# Patient Record
Sex: FEMALE | Race: ASIAN | NOT HISPANIC OR LATINO | Employment: UNEMPLOYED | URBAN - METROPOLITAN AREA
[De-identification: names, ages, dates, MRNs, and addresses within clinical notes are randomized per-mention and may not be internally consistent; named-entity substitution may affect disease eponyms.]

---

## 2018-03-07 PROBLEM — R11.2 INTRACTABLE VOMITING WITH NAUSEA: Status: ACTIVE | Noted: 2018-03-07

## 2018-03-07 PROBLEM — K62.5 BRBPR (BRIGHT RED BLOOD PER RECTUM): Status: ACTIVE | Noted: 2018-03-07

## 2018-03-07 PROBLEM — R19.7 DIARRHEA: Status: ACTIVE | Noted: 2018-03-07

## 2018-03-23 PROBLEM — K62.5 RECTAL BLEEDING: Status: ACTIVE | Noted: 2018-03-23

## 2018-05-29 PROBLEM — S83.511A RUPTURE OF ANTERIOR CRUCIATE LIGAMENT OF RIGHT KNEE: Status: ACTIVE | Noted: 2018-05-29

## 2018-07-09 PROBLEM — Z78.9 DIFFICULTY NAVIGATING STAIRS: Status: ACTIVE | Noted: 2018-07-09

## 2018-07-09 PROBLEM — M62.81 MUSCLE WEAKNESS: Status: ACTIVE | Noted: 2018-07-09

## 2018-07-09 PROBLEM — R26.2 DIFFICULTY WALKING: Status: ACTIVE | Noted: 2018-07-09

## 2018-07-09 PROBLEM — M25.60 LIMITED JOINT RANGE OF MOTION (ROM): Status: ACTIVE | Noted: 2018-07-09

## 2018-07-09 PROBLEM — R60.0 EDEMA OF RIGHT LOWER EXTREMITY: Status: ACTIVE | Noted: 2018-07-09

## 2018-07-09 PROBLEM — Z74.09 IMPAIRED FUNCTIONAL MOBILITY, BALANCE, GAIT, AND ENDURANCE: Status: ACTIVE | Noted: 2018-07-09

## 2018-11-17 ENCOUNTER — NURSE TRIAGE (OUTPATIENT)
Dept: ADMINISTRATIVE | Facility: CLINIC | Age: 38
End: 2018-11-17
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
## 2018-11-19 NOTE — TELEPHONE ENCOUNTER
Spoke to pt will contact if need   appt next week or so due to   As of now starting to feel better     Explained to rest, compresses,   Soaks, and mild exercises   If any changes or things get worse  Contact clinic voiced understanding

## 2019-11-20 PROBLEM — K50.90 CROHN'S DISEASE: Status: ACTIVE | Noted: 2019-11-20

## 2020-03-12 ENCOUNTER — PATIENT MESSAGE (OUTPATIENT)
Dept: PHARMACY | Facility: CLINIC | Age: 40
End: 2020-03-12

## 2020-03-12 ENCOUNTER — TELEPHONE (OUTPATIENT)
Dept: PHARMACY | Facility: CLINIC | Age: 40
End: 2020-03-12

## 2020-03-12 NOTE — TELEPHONE ENCOUNTER
LVM to notify the patient we received the prescription for Humira, and to see if she has any active prescription insurance. Preferred pharmacies only have discount cards on file for the patient. Will send [employer] message. We will continue to follow up.

## 2020-03-16 ENCOUNTER — TELEPHONE (OUTPATIENT)
Dept: PHARMACY | Facility: CLINIC | Age: 40
End: 2020-03-16

## 2020-03-16 NOTE — TELEPHONE ENCOUNTER
LVM to notify the patient we received the prescription for Humira, and to see if she has any active prescription insurance. We will continue to follow up.

## 2020-03-18 ENCOUNTER — TELEPHONE (OUTPATIENT)
Dept: PHARMACY | Facility: CLINIC | Age: 40
End: 2020-03-18

## 2020-03-20 ENCOUNTER — TELEPHONE (OUTPATIENT)
Dept: PHARMACY | Facility: CLINIC | Age: 40
End: 2020-03-20

## 2020-03-20 NOTE — TELEPHONE ENCOUNTER
LVM to see if patient has prescription insurance. I will also message the provider and mail out a postcard. Will push the patient out 2 weeks.

## 2020-07-08 PROBLEM — S82.041A CLOSED DISPLACED COMMINUTED FRACTURE OF RIGHT PATELLA: Status: ACTIVE | Noted: 2020-07-08

## 2020-10-07 PROBLEM — Z87.81 S/P ORIF (OPEN REDUCTION INTERNAL FIXATION) FRACTURE: Status: ACTIVE | Noted: 2020-10-07

## 2020-10-07 PROBLEM — Z98.890 S/P ORIF (OPEN REDUCTION INTERNAL FIXATION) FRACTURE: Status: ACTIVE | Noted: 2020-10-07

## 2021-05-06 ENCOUNTER — PATIENT MESSAGE (OUTPATIENT)
Dept: RESEARCH | Facility: HOSPITAL | Age: 41
End: 2021-05-06

## 2021-05-10 ENCOUNTER — PATIENT MESSAGE (OUTPATIENT)
Dept: RESEARCH | Facility: HOSPITAL | Age: 41
End: 2021-05-10

## 2022-01-24 ENCOUNTER — PATIENT MESSAGE (OUTPATIENT)
Dept: ADMINISTRATIVE | Facility: HOSPITAL | Age: 42
End: 2022-01-24

## 2022-02-16 PROBLEM — R10.84 GENERALIZED ABDOMINAL PAIN: Status: ACTIVE | Noted: 2022-02-16

## 2022-04-04 ENCOUNTER — PATIENT MESSAGE (OUTPATIENT)
Dept: ADMINISTRATIVE | Facility: HOSPITAL | Age: 42
End: 2022-04-04

## 2022-05-10 ENCOUNTER — PATIENT OUTREACH (OUTPATIENT)
Dept: ADMINISTRATIVE | Facility: HOSPITAL | Age: 42
End: 2022-05-10

## 2022-07-11 ENCOUNTER — PATIENT MESSAGE (OUTPATIENT)
Dept: ADMINISTRATIVE | Facility: HOSPITAL | Age: 42
End: 2022-07-11

## 2022-10-03 ENCOUNTER — PATIENT MESSAGE (OUTPATIENT)
Dept: ADMINISTRATIVE | Facility: HOSPITAL | Age: 42
End: 2022-10-03

## 2022-12-14 PROBLEM — N83.201 CYST OF RIGHT OVARY: Status: RESOLVED | Noted: 2022-12-14 | Resolved: 2022-12-14

## 2022-12-14 PROBLEM — Z87.42 S/P REMOVAL OF OVARIAN CYST: Status: ACTIVE | Noted: 2022-12-14

## 2022-12-14 PROBLEM — N83.201 CYST OF RIGHT OVARY: Status: ACTIVE | Noted: 2022-12-14

## 2022-12-14 PROBLEM — Z98.890 S/P REMOVAL OF OVARIAN CYST: Status: ACTIVE | Noted: 2022-12-14

## 2023-03-22 ENCOUNTER — TELEPHONE (OUTPATIENT)
Dept: ENDOSCOPY | Facility: HOSPITAL | Age: 43
End: 2023-03-22
Payer: MEDICARE

## 2023-03-22 ENCOUNTER — TELEPHONE (OUTPATIENT)
Dept: GASTROENTEROLOGY | Facility: CLINIC | Age: 43
End: 2023-03-22
Payer: MEDICARE

## 2023-03-22 NOTE — TELEPHONE ENCOUNTER
----- Message from Trini Bang MA sent at 3/22/2023  1:49 PM CDT -----  Regarding: Manometry  Manometry case request was placed today by Dr. Sidhu.  Patient instructed to call for an appointment.  Please let me know if anything else is needed.  Thank you

## 2023-03-28 ENCOUNTER — TELEPHONE (OUTPATIENT)
Dept: ENDOSCOPY | Facility: HOSPITAL | Age: 43
End: 2023-03-28
Payer: MEDICARE

## 2023-03-28 NOTE — TELEPHONE ENCOUNTER
Patient has orders for an Esophageal Manometry with pH probe insertion. Does patient need a 24 hour pH impedance or an EGD with Bravo? Please clarify and indicate if patient should be on or off PPI/H2 Blockers. Thanks.

## 2023-04-24 ENCOUNTER — TELEPHONE (OUTPATIENT)
Dept: ENDOSCOPY | Facility: HOSPITAL | Age: 43
End: 2023-04-24
Payer: MEDICARE

## 2023-04-24 VITALS — BODY MASS INDEX: 38.51 KG/M2 | HEIGHT: 61 IN | WEIGHT: 204 LBS

## 2023-04-24 NOTE — TELEPHONE ENCOUNTER
Good afternoon,     I got this from MD Sidhu:     She should be off ppi/h2 blocker. And EGD with bravo is fine.   Thanks,   Karlie

## 2023-05-01 ENCOUNTER — HOSPITAL ENCOUNTER (OUTPATIENT)
Facility: HOSPITAL | Age: 43
Discharge: HOME | End: 2023-05-01
Attending: INTERNAL MEDICINE | Admitting: INTERNAL MEDICINE
Payer: MEDICARE

## 2023-05-01 VITALS
HEIGHT: 61 IN | TEMPERATURE: 98 F | BODY MASS INDEX: 37.76 KG/M2 | HEART RATE: 84 BPM | DIASTOLIC BLOOD PRESSURE: 72 MMHG | WEIGHT: 200 LBS | OXYGEN SATURATION: 96 % | RESPIRATION RATE: 15 BRPM | SYSTOLIC BLOOD PRESSURE: 146 MMHG

## 2023-05-01 DIAGNOSIS — K21.9 GERD (GASTROESOPHAGEAL REFLUX DISEASE): ICD-10-CM

## 2023-05-01 PROCEDURE — 91010 ESOPHAGUS MOTILITY STUDY: CPT | Mod: TC | Performed by: INTERNAL MEDICINE

## 2023-05-01 PROCEDURE — 25000003 PHARM REV CODE 250: Performed by: INTERNAL MEDICINE

## 2023-05-01 RX ORDER — LIDOCAINE HYDROCHLORIDE 20 MG/ML
JELLY TOPICAL ONCE
Status: COMPLETED | OUTPATIENT
Start: 2023-05-01 | End: 2023-05-01

## 2023-05-01 RX ADMIN — LIDOCAINE HYDROCHLORIDE 10 ML: 20 JELLY TOPICAL at 02:05

## 2023-05-02 PROCEDURE — 91010 ESOPHAGUS MOTILITY STUDY: CPT | Mod: 26,,, | Performed by: INTERNAL MEDICINE

## 2023-05-02 PROCEDURE — 91010 PR ESOPHAGEAL MOTILITY STUDY, MA2METRY: ICD-10-PCS | Mod: 26,,, | Performed by: INTERNAL MEDICINE

## 2023-05-02 NOTE — [employer] PATIENT INSTRUCTIONS
Discharge Summary/Instructions after an Endoscopic Procedure  Patient Name: Karyn Bobby  Patient MRN: [id number]  Patient YOB: 1980  Tuesday, May 2, 2023  Lexi Lewis MD  Dear patient,  As a result of recent federal legislation (The Federal Cures Act), you may   receive lab or pathology results from your procedure in your [facility]   account before your physician is able to contact you. Your physician or   their representative will relay the results to you with their   recommendations at their soonest availability.  Thank you,  RESTRICTIONS:  During your procedure today, you received medications for sedation.  These   medications may affect your judgment, balance and coordination.  Therefore,   for 24 hours, you have the following restrictions:   - DO NOT drive a car, operate machinery, make legal/financial decisions,   sign important papers or drink alcohol.    ACTIVITY:  Today: no heavy lifting, straining or running due to procedural   sedation/anesthesia.  The following day: return to full activity including work.  DIET:  Eat and drink normally unless instructed otherwise.     TREATMENT FOR COMMON SIDE EFFECTS:  - Mild abdominal pain, nausea, belching, bloating or excessive gas:  rest,   eat lightly and use a heating pad.  - Sore Throat: treat with throat lozenges and/or gargle with warm salt   water.  - Because air was used during the procedure, expelling large amounts of air   from your rectum or belching is normal.  - If a bowel prep was taken, you may not have a bowel movement for 1-3 days.    This is normal.  SYMPTOMS TO WATCH FOR AND REPORT TO YOUR PHYSICIAN:  1. Abdominal pain or bloating, other than gas cramps.  2. Chest pain.  3. Back pain.  4. Signs of infection such as: chills or fever occurring within 24 hours   after the procedure.  5. Rectal bleeding, which would show as bright red, maroon, or black stools.   (A tablespoon of blood from the rectum is not serious, especially if    hemorrhoids are present.)  6. Vomiting.  7. Weakness or dizziness.  GO DIRECTLY TO THE NEAREST EMERGENCY ROOM IF YOU HAVE ANY OF THE FOLLOWING:      Difficulty breathing              Chills and/or fever over 101 F   Persistent vomiting and/or vomiting blood   Severe abdominal pain   Severe chest pain   Black, tarry stools   Bleeding- more than one tablespoon   Any other symptom or condition that you feel may need urgent attention  Your doctor recommends these additional instructions:  If any biopsies were taken, your doctors clinic will contact you in 1 to 2   weeks with any results.  Follow up with your referring provider.  For questions, problems or results please call your physician - Lexi Lewis MD at Work:  [phone].  [facility], EMERGENCY ROOM PHONE NUMBER: [phone]  IF A COMPLICATION OR EMERGENCY SITUATION ARISES AND YOU ARE UNABLE TO REACH   YOUR PHYSICIAN - GO DIRECTLY TO THE EMERGENCY ROOM.  Lexi Lewis MD  5/2/2023 1:54:36 PM  This report has been verified and signed electronically.  Dear patient,  As a result of recent federal legislation (The Federal Cures Act), you may   receive lab or pathology results from your procedure in your [facility]   account before your physician is able to contact you. Your physician or   their representative will relay the results to you with their   recommendations at their soonest availability.  Thank you,  [employer]

## 2023-05-08 ENCOUNTER — TELEPHONE (OUTPATIENT)
Dept: GASTROENTEROLOGY | Facility: CLINIC | Age: 43
End: 2023-05-08
Payer: MEDICARE

## 2023-05-08 ENCOUNTER — ANESTHESIA (OUTPATIENT)
Dept: ENDOSCOPY | Facility: HOSPITAL | Age: 43
End: 2023-05-08
Payer: MEDICARE

## 2023-05-08 ENCOUNTER — ANESTHESIA EVENT (OUTPATIENT)
Dept: ENDOSCOPY | Facility: HOSPITAL | Age: 43
End: 2023-05-08
Payer: MEDICARE

## 2023-05-08 ENCOUNTER — HOSPITAL ENCOUNTER (OUTPATIENT)
Facility: HOSPITAL | Age: 43
Discharge: HOME | End: 2023-05-08
Attending: INTERNAL MEDICINE | Admitting: INTERNAL MEDICINE
Payer: MEDICARE

## 2023-05-08 VITALS
DIASTOLIC BLOOD PRESSURE: 76 MMHG | HEIGHT: 61 IN | SYSTOLIC BLOOD PRESSURE: 135 MMHG | RESPIRATION RATE: 16 BRPM | TEMPERATURE: 98 F | WEIGHT: 200 LBS | BODY MASS INDEX: 37.76 KG/M2 | OXYGEN SATURATION: 99 % | HEART RATE: 72 BPM

## 2023-05-08 DIAGNOSIS — K21.9 GASTROESOPHAGEAL REFLUX DISEASE, UNSPECIFIED WHETHER ESOPHAGITIS PRESENT: Primary | ICD-10-CM

## 2023-05-08 DIAGNOSIS — K21.9 GERD (GASTROESOPHAGEAL REFLUX DISEASE): ICD-10-CM

## 2023-05-08 PROCEDURE — 37000008 HC ANESTHESIA 1ST 15 MINUTES: Performed by: INTERNAL MEDICINE

## 2023-05-08 PROCEDURE — 37000009 HC ANESTHESIA EA ADD 15 MINS: Performed by: INTERNAL MEDICINE

## 2023-05-08 PROCEDURE — 88342 CHG IMMUNOCYTOCHEMISTRY: ICD-10-PCS | Mod: 26,,, | Performed by: PATHOLOGY

## 2023-05-08 PROCEDURE — 88312 PR  SPECIAL STAINS,GROUP I: ICD-10-PCS | Mod: 26,,, | Performed by: PATHOLOGY

## 2023-05-08 PROCEDURE — 43239 PR EGD, FLEX, W/BIOPSY, SGL/MULTI: ICD-10-PCS | Mod: ,,, | Performed by: INTERNAL MEDICINE

## 2023-05-08 PROCEDURE — 27200942: Performed by: INTERNAL MEDICINE

## 2023-05-08 PROCEDURE — 88305 TISSUE EXAM BY PATHOLOGIST: ICD-10-PCS | Mod: 26,,, | Performed by: PATHOLOGY

## 2023-05-08 PROCEDURE — 88305 TISSUE EXAM BY PATHOLOGIST: CPT | Mod: 59 | Performed by: PATHOLOGY

## 2023-05-08 PROCEDURE — 63600175 PHARM REV CODE 636 W HCPCS: Performed by: NURSE ANESTHETIST, CERTIFIED REGISTERED

## 2023-05-08 PROCEDURE — 88312 SPECIAL STAINS GROUP 1: CPT | Mod: 26,,, | Performed by: PATHOLOGY

## 2023-05-08 PROCEDURE — 91035 G-ESOPH REFLX TST W/ELECTROD: CPT | Mod: TC | Performed by: INTERNAL MEDICINE

## 2023-05-08 PROCEDURE — 88305 TISSUE EXAM BY PATHOLOGIST: CPT | Mod: 26,,, | Performed by: PATHOLOGY

## 2023-05-08 PROCEDURE — 88341 IMHCHEM/IMCYTCHM EA ADD ANTB: CPT | Mod: 26,,, | Performed by: PATHOLOGY

## 2023-05-08 PROCEDURE — 25000003 PHARM REV CODE 250: Performed by: INTERNAL MEDICINE

## 2023-05-08 PROCEDURE — E9220 PRA ENDO ANESTHESIA: ICD-10-PCS | Mod: ,,, | Performed by: NURSE ANESTHETIST, CERTIFIED REGISTERED

## 2023-05-08 PROCEDURE — E9220 PRA ENDO ANESTHESIA: HCPCS | Mod: ,,, | Performed by: NURSE ANESTHETIST, CERTIFIED REGISTERED

## 2023-05-08 PROCEDURE — 43239 EGD BIOPSY SINGLE/MULTIPLE: CPT | Mod: ,,, | Performed by: INTERNAL MEDICINE

## 2023-05-08 PROCEDURE — 88341 PR IHC OR ICC EACH ADD'L SINGLE ANTIBODY  STAINPR: ICD-10-PCS | Mod: 26,,, | Performed by: PATHOLOGY

## 2023-05-08 PROCEDURE — 88342 IMHCHEM/IMCYTCHM 1ST ANTB: CPT | Mod: 26,,, | Performed by: PATHOLOGY

## 2023-05-08 PROCEDURE — 43239 EGD BIOPSY SINGLE/MULTIPLE: CPT | Performed by: INTERNAL MEDICINE

## 2023-05-08 PROCEDURE — 88342 IMHCHEM/IMCYTCHM 1ST ANTB: CPT | Performed by: PATHOLOGY

## 2023-05-08 PROCEDURE — 27201012 HC FORCEPS, HOT/COLD, DISP: Performed by: INTERNAL MEDICINE

## 2023-05-08 PROCEDURE — 25000003 PHARM REV CODE 250: Performed by: NURSE ANESTHETIST, CERTIFIED REGISTERED

## 2023-05-08 PROCEDURE — 88312 SPECIAL STAINS GROUP 1: CPT | Performed by: PATHOLOGY

## 2023-05-08 RX ORDER — SODIUM CHLORIDE 0.9 % (FLUSH) 0.9 %
10 SYRINGE (ML) INJECTION
Status: DISCONTINUED | OUTPATIENT
Start: 2023-05-08 | End: 2023-05-08 | Stop reason: HOSPADM

## 2023-05-08 RX ORDER — SODIUM CHLORIDE 0.9 % (FLUSH) 0.9 %
10 SYRINGE (ML) INJECTION
Status: CANCELLED | OUTPATIENT
Start: 2023-05-08

## 2023-05-08 RX ORDER — LIDOCAINE HYDROCHLORIDE 20 MG/ML
INJECTION INTRAVENOUS
Status: DISCONTINUED | OUTPATIENT
Start: 2023-05-08 | End: 2023-05-08

## 2023-05-08 RX ORDER — SODIUM CHLORIDE 9 MG/ML
INJECTION, SOLUTION INTRAVENOUS CONTINUOUS
Status: DISCONTINUED | OUTPATIENT
Start: 2023-05-08 | End: 2023-05-08 | Stop reason: HOSPADM

## 2023-05-08 RX ORDER — PROPOFOL 10 MG/ML
VIAL (ML) INTRAVENOUS CONTINUOUS PRN
Status: DISCONTINUED | OUTPATIENT
Start: 2023-05-08 | End: 2023-05-08

## 2023-05-08 RX ORDER — SODIUM CHLORIDE 9 MG/ML
INJECTION, SOLUTION INTRAVENOUS CONTINUOUS
Status: CANCELLED | OUTPATIENT
Start: 2023-05-08

## 2023-05-08 RX ORDER — PROPOFOL 10 MG/ML
VIAL (ML) INTRAVENOUS
Status: DISCONTINUED | OUTPATIENT
Start: 2023-05-08 | End: 2023-05-08

## 2023-05-08 RX ADMIN — PROPOFOL 20 MG: 10 INJECTION, EMULSION INTRAVENOUS at 12:05

## 2023-05-08 RX ADMIN — PROPOFOL 80 MG: 10 INJECTION, EMULSION INTRAVENOUS at 12:05

## 2023-05-08 RX ADMIN — LIDOCAINE HYDROCHLORIDE 100 MG: 20 INJECTION INTRAVENOUS at 12:05

## 2023-05-08 RX ADMIN — PROPOFOL 30 MG: 10 INJECTION, EMULSION INTRAVENOUS at 12:05

## 2023-05-08 RX ADMIN — PROPOFOL 50 MG: 10 INJECTION, EMULSION INTRAVENOUS at 12:05

## 2023-05-08 RX ADMIN — SODIUM CHLORIDE: 0.9 INJECTION, SOLUTION INTRAVENOUS at 12:05

## 2023-05-08 RX ADMIN — PROPOFOL 200 MCG/KG/MIN: 10 INJECTION, EMULSION INTRAVENOUS at 12:05

## 2023-05-08 NOTE — PROVATION PATIENT INSTRUCTIONS
Discharge Summary/Instructions after an Endoscopic Procedure  Patient Name: Karyn Bobby  Patient MRN: [id number]  Patient YOB: 1980  Monday, May 8, 2023  Karen Lennon MD  Dear patient,  As a result of recent federal legislation (The Federal Cures Act), you may   receive lab or pathology results from your procedure in your [facility]   account before your physician is able to contact you. Your physician or   their representative will relay the results to you with their   recommendations at their soonest availability.  Thank you,  RESTRICTIONS:  During your procedure today, you received medications for sedation.  These   medications may affect your judgment, balance and coordination.  Therefore,   for 24 hours, you have the following restrictions:   - DO NOT drive a car, operate machinery, make legal/financial decisions,   sign important papers or drink alcohol.    ACTIVITY:  Today: no heavy lifting, straining or running due to procedural   sedation/anesthesia.  The following day: return to full activity including work.  DIET:  Eat and drink normally unless instructed otherwise.     TREATMENT FOR COMMON SIDE EFFECTS:  - Mild abdominal pain, nausea, belching, bloating or excessive gas:  rest,   eat lightly and use a heating pad.  - Sore Throat: treat with throat lozenges and/or gargle with warm salt   water.  - Because air was used during the procedure, expelling large amounts of air   from your rectum or belching is normal.  - If a bowel prep was taken, you may not have a bowel movement for 1-3 days.    This is normal.  SYMPTOMS TO WATCH FOR AND REPORT TO YOUR PHYSICIAN:  1. Abdominal pain or bloating, other than gas cramps.  2. Chest pain.  3. Back pain.  4. Signs of infection such as: chills or fever occurring within 24 hours   after the procedure.  5. Rectal bleeding, which would show as bright red, maroon, or black stools.   (A tablespoon of blood from the rectum is not serious, especially if    hemorrhoids are present.)  6. Vomiting.  7. Weakness or dizziness.  GO DIRECTLY TO THE NEAREST EMERGENCY ROOM IF YOU HAVE ANY OF THE FOLLOWING:      Difficulty breathing              Chills and/or fever over 101 F   Persistent vomiting and/or vomiting blood   Severe abdominal pain   Severe chest pain   Black, tarry stools   Bleeding- more than one tablespoon   Any other symptom or condition that you feel may need urgent attention  Your doctor recommends these additional instructions:  If any biopsies were taken, your doctors clinic will contact you in 1 to 2   weeks with any results.  - Discharge patient to home (with escort).   - Resume previous diet.   - Continue present medications.   - Await pathology results.   - The findings and recommendations were discussed with the patient.   - Patient has a contact number available for emergencies.  The signs and   symptoms of potential delayed complications were discussed with the   patient.  Return to normal activities tomorrow.  Written discharge   instructions were provided to the patient.  For questions, problems or results please call your physician - Karen Lennon MD at Work:  [phone].  [facility], EMERGENCY ROOM PHONE NUMBER: [phone]  IF A COMPLICATION OR EMERGENCY SITUATION ARISES AND YOU ARE UNABLE TO REACH   YOUR PHYSICIAN - GO DIRECTLY TO THE EMERGENCY ROOM.  Karen Lennon MD  5/8/2023 1:24:48 PM  This report has been verified and signed electronically.  Dear patient,  As a result of recent federal legislation (The Federal Cures Act), you may   receive lab or pathology results from your procedure in your [facility]   account before your physician is able to contact you. Your physician or   their representative will relay the results to you with their   recommendations at their soonest availability.  Thank you,  [employer]ATION

## 2023-05-08 NOTE — ANESTHESIA PREPROCEDURE EVALUATION
05/08/2023  Karyn Bobby is a 42 y.o., female.    Active Problem List with Overview Notes    Diagnosis Date Noted    S/P drainage of right ovarian cyst 12/14/2022    Generalized abdominal pain 02/16/2022    S/P ORIF (open reduction internal fixation) fracture 10/07/2020    Closed displaced comminuted fracture of right patella 07/08/2020    Crohn's disease 11/20/2019    Muscle weakness 07/09/2018    Difficulty walking 07/09/2018    Limited joint range of motion (ROM) 07/09/2018    Edema of right lower extremity 07/09/2018    Difficulty navigating stairs 07/09/2018    Impaired functional mobility, balance, gait, and endurance 07/09/2018    Rupture of anterior cruciate ligament of right knee 05/29/2018    Rectal bleeding 03/23/2018    Diarrhea 03/07/2018    Intractable vomiting with nausea 03/07/2018    BRBPR (bright red blood per rectum) 03/07/2018    Rupture of anterior cruciate ligament of knee, subsequent encounter 09/28/2016     Past Surgical History:   Procedure Laterality Date    ARTHROSCOPIC REPAIR OF ANTERIOR CRUCIATE LIGAMENT Right 5/29/2018    Procedure: REPAIR ANTERIOR CRUCIATE LIGAMENT-ARTHROSCOPICALLY AIDED;  Surgeon: Mal Vogel MD;  Location: [facility] OR;  Service: Orthopedics;  Laterality: Right;    COLONOSCOPY      20's    COLONOSCOPY N/A 3/23/2018    Procedure: COLONOSCOPY;  Surgeon: Brianne Willard MD;  Location: [facility];  Service: Endoscopy;  Laterality: N/A;    COLONOSCOPY N/A 11/20/2019    Procedure: COLONOSCOPY;  Surgeon: Benson Alvarado MD;  Location: [facility];  Service: Endoscopy;  Laterality: N/A;    COLONOSCOPY N/A 10/19/2021    Procedure: COLONOSCOPY;  Surgeon: Brianne Willard MD;  Location: [facility];  Service: Endoscopy;  Laterality: N/A;  Patient may be a few minutes late has to put daughter on bus    e sure  2012    ENDOMETRIAL  ABLATION      ESOPHAGEAL MANOMETRY WITH MEASUREMENT OF IMPEDANCE N/A 5/1/2023    Procedure: MANOMETRY, ESOPHAGUS, WITH IMPEDANCE MEASUREMENT;  Surgeon: Lexi Lewis MD;  Location: [facility] ([address] [place] [address]);  Service: Endoscopy;  Laterality: N/A;  instructions sent to [email or web address]    ESOPHAGOGASTRODUODENOSCOPY N/A 10/19/2021    Procedure: EGD (ESOPHAGOGASTRODUODENOSCOPY);  Surgeon: Brianne Willard MD;  Location: [facility];  Service: Endoscopy;  Laterality: N/A;    EXAMINATION UNDER ANESTHESIA N/A 12/14/2022    Procedure: EXAM UNDER ANESTHESIA;  Surgeon: Jordi Samaniego MD;  Location: [facility];  Service: OB/GYN;  Laterality: N/A;    HARDWARE REMOVAL Right 10/15/2020    Procedure: REMOVAL OF HARDWARE PATELLA - [place];  Surgeon: Troy Ferrell MD;  Location: [facility];  Service: Orthopedics;  Laterality: Right;    KNEE ARTHROSCOPY W/ MENISCECTOMY Right 5/29/2018    Procedure: ARTHROSCOPY-MENISCECTOMY;  Surgeon: Mal Vogel MD;  Location: [facility] OR;  Service: Orthopedics;  Laterality: Right;    LAPAROSCOPIC CHOLECYSTECTOMY N/A 4/6/2022    Procedure: CHOLECYSTECTOMY, LAPAROSCOPIC;  Surgeon: Joe Michaud MD;  Location: [facility];  Service: General;  Laterality: N/A;    LAPAROSCOPIC DRAINAGE OF CYST OF OVARY Right 12/14/2022    Procedure: DRAINAGE, CYST, OVARY, LAPAROSCOPIC;  Surgeon: Jordi Samaniego MD;  Location: [facility];  Service: OB/GYN;  Laterality: Right;    LIPOMA RESECTION      OPEN REDUCTION AND INTERNAL FIXATION (ORIF) OF FRACTURE OF PATELLA Right 7/10/2020    Procedure: ORIF, FRACTURE, PATELLA;  Surgeon: Troy Ferrell MD;  Location: [facility];  Service: Orthopedics;  Laterality: Right;     Results for orders placed or performed during the hospital encounter of 03/13/23   EKG 12-lead    Collection Time: 03/13/23  9:59 AM    Narrative    Test Reason : R07.9    Vent. Rate : 100 BPM     Atrial Rate : 100 BPM     P-R Int : 130 ms          QRS Dur : 084 ms      QT Int : 396 ms       P-R-T Axes : 041 093  000 degrees     QTc Int : 510 ms    Normal sinus rhythm  Rightward axis  Prolonged QT  When compared with ECG of 04-APR-2022 10:32,  Vent. rate has increased BY  38 BPM  Nonspecific T wave abnormality now evident in Inferior leads  Inverted T waves have replaced nonspecific T wave abnormality in Anterior  leads  QT has lengthened  Confirmed by Chano Gutierrez MD ([id number]) on 3/14/2023 8:54:58 AM    Referred By: [email or web address]   SELF           Confirmed By:Chano Gutierrez MD       Pre-op Assessment    I have reviewed the Patient Summary Reports.    I have reviewed the NPO Status.   I have reviewed the Medications.     Review of Systems  Anesthesia Hx:  No problems with previous Anesthesia    Hematology/Oncology:  Hematology Normal   Oncology Normal     EENT/Dental:EENT/Dental Normal   Cardiovascular:  Cardiovascular Normal     Pulmonary:  Pulmonary Normal    Renal/:  Renal/ Normal     Hepatic/GI:   GERD Crohn's   Musculoskeletal:  Musculoskeletal Normal    Neurological:   Neuromuscular Disease, Headaches    Endocrine:  Endocrine Normal    Dermatological:  Skin Normal    Psych:   Psychiatric History          Physical Exam  General: Well nourished, Cooperative, Alert and Oriented    Airway:  Mallampati: II   Mouth Opening: Normal  TM Distance: Normal  Tongue: Normal  Neck ROM: Normal ROM    Dental:  Intact    Chest/Lungs:  Normal Respiratory Rate        Anesthesia Plan  Type of Anesthesia, risks & benefits discussed:    Anesthesia Type: Gen Natural Airway  Intra-op Monitoring Plan: Standard ASA Monitors  Post Op Pain Control Plan: multimodal analgesia  Induction:  IV  Informed Consent: Informed consent signed with the Patient and all parties understand the risks and agree with anesthesia plan.  All questions answered.   ASA Score: 2  Day of Surgery Review of History & Physical: H&P Update referred to the surgeon/provider.    Ready For Surgery From Anesthesia Perspective.     .

## 2023-05-08 NOTE — H&P
Short Stay Endoscopy History and Physical    PCP - Yumiko Carrillo NP     Procedure - EGD/BRAVO  ASA - per anesthesia  Mallampati - per anesthesia  History of Anesthesia problems - no  Family history Anesthesia problems -  no   Plan of anesthesia - General    HPI:  This is a 42 y.o. female here for evaluation of GERD        Medical History:  has a past medical history of ACL (anterior cruciate ligament) rupture, Anxiety, Arthritis, Carpal tunnel syndrome, Contact lens/glasses fitting, Crohn disease, Crohn's disease, Depression, GERD (gastroesophageal reflux disease), Joint pain, Migraine headache, Patella fracture, PVC (premature ventricular contraction), and STD (sexually transmitted disease) (2001).    Surgical History:  has a past surgical history that includes e sure (2012); Endometrial ablation; Lipoma resection; Colonoscopy; Colonoscopy (N/A, 3/23/2018); Knee arthroscopy w/ meniscectomy (Right, 5/29/2018); Arthroscopic repair of anterior cruciate ligament (Right, 5/29/2018); Colonoscopy (N/A, 11/20/2019); Open reduction and internal fixation (ORIF) of fracture of patella (Right, 7/10/2020); Hardware Removal (Right, 10/15/2020); Esophagogastroduodenoscopy (N/A, 10/19/2021); Colonoscopy (N/A, 10/19/2021); Laparoscopic cholecystectomy (N/A, 4/6/2022); Laparoscopic drainage of cyst of ovary (Right, 12/14/2022); Examination under anesthesia (N/A, 12/14/2022); and Esophageal manometry with measurement of impedance (N/A, 5/1/2023).    Family History: family history includes No Known Problems in her father and mother. She was adopted.. Otherwise no colon cancer, inflammatory bowel disease, or GI malignancies.    Social History:  reports that she quit smoking about 22 years ago. Her smoking use included cigarettes. She started smoking about 23 years ago. She has never used smokeless tobacco. She reports current alcohol use. She reports that she does not use drugs.    Review of patient's allergies indicates:   Allergen  Reactions    Iodinated contrast media     Methocarbamol Itching    Percocet [oxycodone-acetaminophen] Nausea Only    Shellfish containing products Hives       Medications:   Medications Prior to Admission   Medication Sig Dispense Refill Last Dose    adalimumab (HUMIRA PEN) PnKt injection INJECT CONTENTS OF 1 PEN (40MG) EVERY 14 DAYS AS DIRECTED 2 pen 3 Past Week    albuterol (PROVENTIL/VENTOLIN HFA) 90 mcg/actuation inhaler INHALE 1-2 PUFFS INTO THE LUNGS EVERY 6 (SIX) HOURS AS NEEDED FOR SHORTNESS OF BREATH. RESCUE 18 g 0 Past Month    azaTHIOprine (IMURAN) 50 mg Tab Take 2 tablets (100 mg total) by mouth once daily. 180 tablet 3 Past Week    busPIRone (BUSPAR) 5 MG Tab Take 1 tablet (5 mg total) by mouth 2 (two) times daily. 60 tablet 5 Past Week    citalopram (CELEXA) 40 MG tablet TAKE 1 TABLET (40 MG TOTAL) BY MOUTH ONCE DAILY. 90 tablet 3 Past Week    cyclobenzaprine (FLEXERIL) 10 MG tablet Take 1 tablet (10 mg total) by mouth every evening. 90 tablet 1 Past Week    gabapentin (NEURONTIN) 300 MG capsule TAKE 1 CAPSULE BY MOUTH THREE TIMES DAILY AS DIRECTED   Past Week    hydroCHLOROthiazide (HYDRODIURIL) 25 MG tablet Take 1 tablet (25 mg total) by mouth once daily. 90 tablet 3 Past Week    hydrOXYzine pamoate (VISTARIL) 25 MG Cap TAKE 1 CAPSULE (25 MG TOTAL) BY MOUTH NIGHTLY AS NEEDED. 30 capsule 3 Past Week    ondansetron (ZOFRAN-ODT) 4 MG TbDL Take 4 mg by mouth every 6 (six) hours.   Past Week    CYMBALTA 30 mg capsule Take 30 mg by mouth.       EPINEPHrine (EPIPEN) 0.3 mg/0.3 mL AtIn Inject 0.3 mLs (0.3 mg total) into the muscle once. For signs of anaphylaxis emergency for 1 dose 1 each 0     ibuprofen (ADVIL,MOTRIN) 600 MG tablet Take 1 tablet (600 mg total) by mouth every 6 (six) hours as needed for Pain. 30 tablet 0     oxyCODONE (ROXICODONE) 5 MG immediate release tablet Take 1 tablet (5 mg total) by mouth every 6 (six) hours as needed for Pain. (Patient not taking: Reported on 4/24/2023) 10 tablet 0  More than a month    potassium chloride SA (K-DUR,KLOR-CON) 20 MEQ tablet Take 1 tablet (20 mEq total) by mouth 2 (two) times daily. (Patient not taking: Reported on 3/22/2023) 10 tablet 0 More than a month       Physical Exam:    Vital Signs:   Vitals:    05/08/23 1157   BP: 131/74   Pulse: 98   Resp: 15   Temp: 97.9 °F (36.6 °C)       I have explained the risks and benefits of endoscopy procedures to the patient including but not limited to bleeding, perforation, infection, and death.      Karen Lennon MD

## 2023-05-08 NOTE — TRANSFER OF CARE
"Anesthesia Transfer of Care Note    Patient: Karyn Bboby    Procedure(s) Performed: Procedure(s) (LRB):  ESOPHAGOGASTRODUODENOSCOPY (EGD), WITH BRAVO (N/A)  PH MONITORING, ESOPHAGUS, WIRELESS, (OFF REFLUX MEDS) (N/A)    Patient location: GI    Anesthesia Type: general    Transport from OR: Transported from OR on room air with adequate spontaneous ventilation    Post pain: adequate analgesia    Post assessment: no apparent anesthetic complications and tolerated procedure well    Post vital signs: stable    Level of consciousness: awake, alert and oriented    Nausea/Vomiting: no nausea/vomiting    Complications: none    Transfer of care protocol was followed      Last vitals:   Visit Vitals  /74   Pulse 98   Temp 36.6 °C (97.9 °F)   Resp 15   Ht 5' 1" (1.549 m)   Wt 90.7 kg (200 lb)   SpO2 95%   Breastfeeding No   BMI 37.79 kg/m²     "

## 2023-05-08 NOTE — TELEPHONE ENCOUNTER
Short Stay Endoscopy History and Physical    PCP - Yumiko Carrillo NP     Procedure - EGD/BRAVO  ASA - per anesthesia  Mallampati - per anesthesia  History of Anesthesia problems - no  Family history Anesthesia problems -  no   Plan of anesthesia - General    HPI:  This is a 42 y.o. female here for evaluation of GERD:        Medical History:  has a past medical history of ACL (anterior cruciate ligament) rupture, Anxiety, Arthritis, Carpal tunnel syndrome, Contact lens/glasses fitting, Crohn disease, Crohn's disease, Depression, GERD (gastroesophageal reflux disease), Joint pain, Migraine headache, Patella fracture, PVC (premature ventricular contraction), and STD (sexually transmitted disease) (2001).    Surgical History:  has a past surgical history that includes e sure (2012); Endometrial ablation; Lipoma resection; Colonoscopy; Colonoscopy (N/A, 3/23/2018); Knee arthroscopy w/ meniscectomy (Right, 5/29/2018); Arthroscopic repair of anterior cruciate ligament (Right, 5/29/2018); Colonoscopy (N/A, 11/20/2019); Open reduction and internal fixation (ORIF) of fracture of patella (Right, 7/10/2020); Hardware Removal (Right, 10/15/2020); Esophagogastroduodenoscopy (N/A, 10/19/2021); Colonoscopy (N/A, 10/19/2021); Laparoscopic cholecystectomy (N/A, 4/6/2022); Laparoscopic drainage of cyst of ovary (Right, 12/14/2022); Examination under anesthesia (N/A, 12/14/2022); and Esophageal manometry with measurement of impedance (N/A, 5/1/2023).    Family History: family history includes No Known Problems in her father and mother. She was adopted.. Otherwise no colon cancer, inflammatory bowel disease, or GI malignancies.    Social History:  reports that she quit smoking about 22 years ago. Her smoking use included cigarettes. She started smoking about 23 years ago. She has never used smokeless tobacco. She reports current alcohol use. She reports that she does not use drugs.    Review of patient's allergies indicates:    Allergen Reactions    Iodinated contrast media     Methocarbamol Itching    Percocet [oxycodone-acetaminophen] Nausea Only    Shellfish containing products Hives       Medications:   [unfilled]    Physical Exam:    Vital Signs: [unfilled]    I have explained the risks and benefits of endoscopy procedures to the patient including but not limited to bleeding, perforation, infection, and death.      Karen Lennon MD

## 2023-05-12 NOTE — ANESTHESIA POSTPROCEDURE EVALUATION
Anesthesia Post Evaluation    Patient: Karyn Bobby    Procedure(s) Performed: Procedure(s) (LRB):  ESOPHAGOGASTRODUODENOSCOPY (EGD), WITH BRAVO (N/A)  PH MONITORING, ESOPHAGUS, WIRELESS, (OFF REFLUX MEDS) (N/A)    Final Anesthesia Type: general      Patient location during evaluation: PACU  Patient participation: Yes- Able to Participate  Level of consciousness: awake and alert and oriented  Pain management: adequate  Airway patency: patent    PONV status at discharge: No PONV  Anesthetic complications: no      Cardiovascular status: blood pressure returned to baseline and hemodynamically stable  Respiratory status: unassisted  Hydration status: euvolemic  Follow-up not needed.          Vitals Value Taken Time   /87 05/10/23 0948   Temp 36.7 °C (98 °F) 05/10/23 0948   Pulse 85 05/10/23 0948   Resp 16 05/10/23 0948   SpO2 95 % 05/10/23 0948         Event Time   Out of Recovery 14:21:01         Pain/Gasper Score: No data recorded

## 2023-05-18 LAB
FINAL PATHOLOGIC DIAGNOSIS: NORMAL
GROSS: NORMAL
Lab: NORMAL
SUPPLEMENTAL DIAGNOSIS: NORMAL

## 2023-05-22 ENCOUNTER — TELEPHONE (OUTPATIENT)
Dept: GASTROENTEROLOGY | Facility: CLINIC | Age: 43
End: 2023-05-22
Payer: MEDICARE

## 2023-05-22 NOTE — TELEPHONE ENCOUNTER
Ma attempted to call pt to go over whats below ,no answer lvm also pt results ws sent through the protal     ----- Message from Ayleen Mast MA sent at 5/20/2023 11:25 PM CDT -----    ----- Message -----  From: Karen Lennon MD  Sent: 5/19/2023   5:28 PM CDT  To: Yumiko Carrillo NP, Evan Gonzalez MD, #    I have received the biopsy results from your recent endoscopic procedure(s). They include the following:     Ferris's esophagus, also known as intestinal metaplasia of the connection between esophagus and stomach (early pre-cancerous changes in the stomach that may transform into cancer over a long period of time.  These changes are related to acid reflux).  This will require periodic monitoring with endoscopy to assure that it does not transform into cancer.      I recommend repeat EGD in 3 years    EGD or colonoscopy is not a perfect exam. Rarely a significant finding can be missed. Do not ignore symptoms GI such as blood with your bowel movements or abdominal pain. Report these symptoms to your doctor if they occur.     You should follow up with the referring provider- Evan Gonzalez MD     Sincerely,   Dr. Lennon

## 2023-06-05 ENCOUNTER — TELEPHONE (OUTPATIENT)
Dept: GASTROENTEROLOGY | Facility: CLINIC | Age: 43
End: 2023-06-05
Payer: MEDICARE

## 2023-06-05 PROCEDURE — 91035 G-ESOPH REFLX TST W/ELECTROD: CPT | Mod: 26,,, | Performed by: INTERNAL MEDICINE

## 2023-06-05 PROCEDURE — 91035 PR GERD TST W/ MUCOS PH ELECTROD: ICD-10-PCS | Mod: 26,,, | Performed by: INTERNAL MEDICINE

## 2023-06-05 NOTE — PROVATION PATIENT INSTRUCTIONS
Discharge Summary/Instructions after an Endoscopic Procedure  Patient Name: Karyn Bobby  Patient MRN: [id number]  Patient YOB: 1980  Monday, June 5, 2023  Karen Lennon MD  Dear patient,  As a result of recent federal legislation (The Federal Cures Act), you may   receive lab or pathology results from your procedure in your [facility]   account before your physician is able to contact you. Your physician or   their representative will relay the results to you with their   recommendations at their soonest availability.  Thank you,  RESTRICTIONS:  During your procedure today, you received medications for sedation.  These   medications may affect your judgment, balance and coordination.  Therefore,   for 24 hours, you have the following restrictions:   - DO NOT drive a car, operate machinery, make legal/financial decisions,   sign important papers or drink alcohol.    ACTIVITY:  Today: no heavy lifting, straining or running due to procedural   sedation/anesthesia.  The following day: return to full activity including work.  DIET:  Eat and drink normally unless instructed otherwise.     TREATMENT FOR COMMON SIDE EFFECTS:  - Mild abdominal pain, nausea, belching, bloating or excessive gas:  rest,   eat lightly and use a heating pad.  - Sore Throat: treat with throat lozenges and/or gargle with warm salt   water.  - Because air was used during the procedure, expelling large amounts of air   from your rectum or belching is normal.  - If a bowel prep was taken, you may not have a bowel movement for 1-3 days.    This is normal.  SYMPTOMS TO WATCH FOR AND REPORT TO YOUR PHYSICIAN:  1. Abdominal pain or bloating, other than gas cramps.  2. Chest pain.  3. Back pain.  4. Signs of infection such as: chills or fever occurring within 24 hours   after the procedure.  5. Rectal bleeding, which would show as bright red, maroon, or black stools.   (A tablespoon of blood from the rectum is not serious, especially if    hemorrhoids are present.)  6. Vomiting.  7. Weakness or dizziness.  GO DIRECTLY TO THE NEAREST EMERGENCY ROOM IF YOU HAVE ANY OF THE FOLLOWING:      Difficulty breathing              Chills and/or fever over 101 F   Persistent vomiting and/or vomiting blood   Severe abdominal pain   Severe chest pain   Black, tarry stools   Bleeding- more than one tablespoon   Any other symptom or condition that you feel may need urgent attention  Your doctor recommends these additional instructions:  If any biopsies were taken, your doctors clinic will contact you in 1 to 2   weeks with any results.  - Return to my office as previously scheduled.  For questions, problems or results please call your physician - Karen Lennon MD at Work:  [phone].  [facility], EMERGENCY ROOM PHONE NUMBER: [phone]  IF A COMPLICATION OR EMERGENCY SITUATION ARISES AND YOU ARE UNABLE TO REACH   YOUR PHYSICIAN - GO DIRECTLY TO THE EMERGENCY ROOM.  Karen Lennon MD  6/5/2023 5:49:25 PM  This report has been verified and signed electronically.  Dear patient,  As a result of recent federal legislation (The Federal Cures Act), you may   receive lab or pathology results from your procedure in your [facility]   account before your physician is able to contact you. Your physician or   their representative will relay the results to you with their   recommendations at their soonest availability.  Thank you,  PROVATION

## 2023-06-05 NOTE — TELEPHONE ENCOUNTER
BRAVO Results:    Severe GERD  Abnormal acid exposure time off PPI  Total percent time pH less than 4 (< 4.5; <1 if PPI BID): 17  Percent time pH less than 4 Upright (<8.4; <1.5 if PPI BID): 18  Percent time pH less than 4 Recumbent (<3.5; 0.5 if PPI BID): 16  DeMeester Score (<14.7): 57  Poor correlation between symptoms and reflux episodes    Please see official report under procedures and raw data in media  Sincerely,   Dr. Lennon

## 2023-07-19 PROBLEM — Z87.19 HISTORY OF REPAIR OF HIATAL HERNIA: Status: ACTIVE | Noted: 2022-12-14

## 2023-07-26 ENCOUNTER — TELEPHONE (OUTPATIENT)
Dept: BARIATRICS | Facility: CLINIC | Age: 43
End: 2023-07-26
Payer: MEDICARE

## 2023-07-26 NOTE — TELEPHONE ENCOUNTER
----- Message from Suha Pack sent at 7/26/2023 11:40 AM CDT -----  Regarding: pt advice  Contact: [phone]  AMBER SCHILLING calling regarding Patient Advice (message) for #requesting a call back regarding she have questions about gastric sleeve. Please call     Cloth T[employer]

## 2024-05-06 PROBLEM — R26.2 DIFFICULTY WALKING: Status: RESOLVED | Noted: 2018-07-09 | Resolved: 2024-05-06

## 2024-11-26 ENCOUNTER — PATIENT MESSAGE (OUTPATIENT)
Dept: ADMINISTRATIVE | Facility: HOSPITAL | Age: 44
End: 2024-11-26
Payer: MEDICARE

## 2025-05-27 ENCOUNTER — OFFICE VISIT (OUTPATIENT)
Dept: SURGERY | Facility: CLINIC | Age: 45
End: 2025-05-27
Payer: MEDICARE

## 2025-05-27 VITALS
SYSTOLIC BLOOD PRESSURE: 137 MMHG | WEIGHT: 196.13 LBS | HEART RATE: 89 BPM | BODY MASS INDEX: 37.03 KG/M2 | HEIGHT: 61 IN | DIASTOLIC BLOOD PRESSURE: 91 MMHG

## 2025-05-27 DIAGNOSIS — K44.9 HIATAL HERNIA WITH GERD: ICD-10-CM

## 2025-05-27 DIAGNOSIS — K21.9 HIATAL HERNIA WITH GERD: ICD-10-CM

## 2025-05-27 DIAGNOSIS — K44.9 HERNIA, HIATAL: Primary | ICD-10-CM

## 2025-05-27 DIAGNOSIS — R11.0 NAUSEA: ICD-10-CM

## 2025-05-27 PROCEDURE — 99215 OFFICE O/P EST HI 40 MIN: CPT | Mod: PBBFAC | Performed by: SURGERY

## 2025-05-27 PROCEDURE — 99999 PR PBB SHADOW E&M-EST. PATIENT-LVL V: CPT | Mod: PBBFAC,,, | Performed by: SURGERY

## 2025-05-27 PROCEDURE — 99204 OFFICE O/P NEW MOD 45 MIN: CPT | Mod: S$PBB,,, | Performed by: SURGERY

## 2025-05-27 RX ORDER — ONDANSETRON 8 MG/1
8 TABLET, ORALLY DISINTEGRATING ORAL EVERY 8 HOURS PRN
Qty: 60 TABLET | Refills: 3 | Status: SHIPPED | OUTPATIENT
Start: 2025-05-27

## 2025-05-27 NOTE — PROGRESS NOTES
I have seen the patient, reviewed the Student's history and physical, assessment and plan. I have personally interviewed and examined the patient at bedside and: agree with the findings.     45y/o with bmi 37, depression, diarrhea, muscle weakness, impaired motility, crohn's disease on medications, s/p lap hh with toupet 2023 (posterior closure with suture) with recurrence.  S/p lap ovarian surgery, s/p lap krystle.  She did well initially with her hh repair but developed nausea and vomiting and also has heartburn.  She also has dysphagia.  She has symptoms despite zofran 1-2 times a day.  She is not on ppi as it doesn't help.    She is a stay at home mom.    Cbc, cmp reviewed, ok  Ugi reviewed, films viewed, moderate hh with regurgitation and dysphagia.  Other findings in report.  Suggest modified bas.  Egd reviewed, gastritis, moderate hh  Preop hh repair  motility and ph showed, poor motility and positive reflux  EKG 2023 reviewed    -Nonspecific T wave abnormality     -Prolonged QT     Recurrent hh with dysphagia.  It is suggested she obtain mbas (as suggested on ugi) and she can follow up with her pcp.  Obtain dynamic ges.  For robotic redo hh with fundoplication with endoflip.  1 week prop liquid bariatric liquid diet preop.  Pcp clearance. She can follow up with weight loss with her pcp.

## 2025-05-27 NOTE — PROGRESS NOTES
"[facility] H&P    Subjective:     Karyn Bobby is a 44 y.o. female with PMH Crohn's disease, depression and GERD who presents to clinic for evaluation of recurrent hiatal hernia. Patient has been struggling with N/V, regurgitation and reflux for several years. She had a laparoscopic fundoplication on 6/27/23 and this alleviated her symptoms for about 6-10 months before they began to recur. She is currently experiencing her symptoms (NV, regurg, reflux) daily and they occasionally wake her from sleep. She has not noted any foods that make her symptoms worse and states that "it is constant." She takes zofran for the nausea and is needing to take it once or twice per day. She is also experiencing regular dysphagia both to solid and liquids. It is also painful for her to swallow while eating. Patient is taking Humira for her Crohn's and is currently well controlled. She recently changed from taking medication once per 14 days to once per week and things this has been helping with some of her hiatal hernia symptoms.    GERD Questionnaire  Meds: zofran for nausea, PPIs aren't helping  + Typical heartburn  + Regurgitation  + Dysphagia solids  + Dysphagia liquids  - Hoarseness  - Sore throat  - Cough  - Asthma  - Chest pain  + Water brash  + Globus  + Nausea  + Vomiting     Eckardt Score (none =0, occ=1, daily=2, every meal=3, weight: 0=0, <5=1, 5-10=2, >10=3)  Dysphagia = 3  Regurgitation = 3  Chest pain = 0  Weight loss (kg) = 0 lb (not dieting), 0  Total = 6     PMH:   Past Medical History:   Diagnosis Date    ACL (anterior cruciate ligament) rupture     RIGHT    Anxiety     Arthritis     Carpal tunnel syndrome     Contact lens/glasses fitting     Crohn's disease     Depression     Esophagitis     GERD (gastroesophageal reflux disease)     Joint pain     Migraine headache     Patella fracture     PVC (premature ventricular contraction)     STD (sexually transmitted disease) 2001    chlamydia "       Past Surgical History:   Past Surgical History:   Procedure Laterality Date    ARTHROSCOPIC REPAIR OF ANTERIOR CRUCIATE LIGAMENT Right 5/29/2018    Procedure: REPAIR ANTERIOR CRUCIATE LIGAMENT-ARTHROSCOPICALLY AIDED;  Surgeon: Mal Vogel MD;  Location: [facility];  Service: Orthopedics;  Laterality: Right;    COLONOSCOPY      20's    COLONOSCOPY N/A 3/23/2018    Procedure: COLONOSCOPY;  Surgeon: Brianne Willard MD;  Location: [facility];  Service: Endoscopy;  Laterality: N/A;    COLONOSCOPY N/A 11/20/2019    Procedure: COLONOSCOPY;  Surgeon: Benson Alvarado MD;  Location: [facility];  Service: Endoscopy;  Laterality: N/A;    COLONOSCOPY N/A 10/19/2021    Procedure: COLONOSCOPY;  Surgeon: Brianne Willard MD;  Location: [facility];  Service: Endoscopy;  Laterality: N/A;  Patient may be a few minutes late has to put daughter on bus    COLONOSCOPY N/A 6/24/2024    Procedure: COLONOSCOPY;  Surgeon: Brianne Willard MD;  Location: [facility];  Service: Endoscopy;  Laterality: N/A;    e sure  2012    ENDOMETRIAL ABLATION      ESOPHAGEAL MANOMETRY WITH MEASUREMENT OF IMPEDANCE N/A 5/1/2023    Procedure: MANOMETRY, ESOPHAGUS, WITH IMPEDANCE MEASUREMENT;  Surgeon: Lexi Lewis MD;  [facility]  Service: Endoscopy;  Laterality: N/A;  instructions sent to [facility]    ESOPHAGOGASTRODUODENOSCOPY N/A 10/19/2021    Procedure: EGD (ESOPHAGOGASTRODUODENOSCOPY);  Surgeon: Brianne Willard MD;  Location: [facility];  Service: Endoscopy;  Laterality: N/A;    ESOPHAGOGASTRODUODENOSCOPY N/A 5/8/2023    Procedure: ESOPHAGOGASTRODUODENOSCOPY (EGD), WITH BRAVO;  Surgeon: Karen Lennon MD;  Location: [facility] ([facility]);  Service: Endoscopy;  Laterality: N/A;  instructions sent to [facility]  preop call no answer BP    ESOPHAGOGASTRODUODENOSCOPY N/A 2/26/2025    Procedure: EGD (ESOPHAGOGASTRODUODENOSCOPY);  Surgeon: Joe Michaud MD;  Location: [facility];  Service:  General;  Laterality: N/A;    EXAMINATION UNDER ANESTHESIA N/A 12/14/2022    Procedure: EXAM UNDER ANESTHESIA;  Surgeon: Jordi Samaniego MD;  [facility]  Service: OB/GYN;  Laterality: N/A;    HARDWARE REMOVAL Right 10/15/2020    Procedure: REMOVAL OF HARDWARE PATELLA - [place];  Surgeon: Troy Ferrell MD;  [facility];  Service: Orthopedics;  Laterality: Right;    KNEE ARTHROSCOPY W/ MENISCECTOMY Right 5/29/2018    Procedure: ARTHROSCOPY-MENISCECTOMY;  Surgeon: Mal Vogel MD;  Location: [facility];  Service: Orthopedics;  Laterality: Right;    LAPAROSCOPIC CHOLECYSTECTOMY N/A 4/6/2022    Procedure: CHOLECYSTECTOMY, LAPAROSCOPIC;  Surgeon: Joe Michaud MD;  [facility];  Service: General;  Laterality: N/A;    LAPAROSCOPIC DRAINAGE OF CYST OF OVARY Right 12/14/2022    Procedure: DRAINAGE, CYST, OVARY, LAPAROSCOPIC;  Surgeon: Jordi Samaniego MD;  [facility];  Service: OB/GYN;  Laterality: Right;    LAPAROSCOPIC TOUPET FUNDOPLICATION N/A 6/27/2023    Procedure: FUNDOPLICATION, LAPAROSCOPIC, TOUPET;  Surgeon: Thomas Díaz MD;  [facility];  Service: General;  Laterality: N/A;    LIPOMA RESECTION      OPEN REDUCTION AND INTERNAL FIXATION (ORIF) OF FRACTURE OF PATELLA Right 7/10/2020    Procedure: ORIF, FRACTURE, PATELLA;  Surgeon: Troy Ferrell MD;  [facility];  Service: Orthopedics;  Laterality: Right;    PH MONITORING, ESOPHAGUS, WIRELESS, (OFF REFLUX MEDS) N/A 5/8/2023    Procedure: PH MONITORING, ESOPHAGUS, WIRELESS, (OFF REFLUX MEDS);  Surgeon: Karen Lennon MD;  Location: [address]);  Service: Endoscopy;  Laterality: N/A;  48 Hour  Off PPI/H2 Blocker  No allergy or sensitivity to metal/jewelry per pt-KPvt    REPAIR, HERNIA, HIATAL, LAPAROSCOPIC N/A 6/27/2023    Procedure: REPAIR, HERNIA, HIATAL, LAPAROSCOPIC;  Surgeon: Thomas Díaz MD;  [facility];  Service: General;  Laterality: N/A;       Social History:Social History[1]  Pt is a stay at home  mom    Allergies:   Review of patient's allergies indicates:   Allergen Reactions    Iodinated contrast media     Methocarbamol Itching    Percocet [oxycodone-acetaminophen] Nausea Only    Shellfish containing products Hives       Medications:  Medications Ordered Prior to Encounter[2]      Objective:     Vital Signs (Most Recent)  Pulse: 89 (05/27/25 1108)  BP: (!) 137/91 (05/27/25 1108)    Review of Systems   Constitutional:  Negative for chills, fever and weight loss.   Respiratory:  Negative for cough.    Cardiovascular:  Negative for chest pain.   Gastrointestinal:  Positive for heartburn, nausea and vomiting. Negative for constipation and diarrhea.   Genitourinary:  Negative for dysuria.    A 10+ review of systems was performed with pertinent positives and negatives noted above in the history of present illness.  Other systems were negative unless otherwise specified.    Physical Exam:  Gen: awake, alert, in no acute distress  HEENT: normocephalic, atraumatic, EOMI, no scleral icterus  CV: regular rate and rhythm  Pulm: equal chest rise bilaterally, normal work of breathing  Abd:  soft, non-tender, no guarding  Ext: WWP, skin warm and dry    Imaging  The following imaging was reviewed:   FL Esophagram (2/20/25)  Impression:  1. Esophageal dysmotility.  2. Moderate-sized sliding-type hiatal hernia.  3. Mild Schatzki's ring.  4. The patient had mild trouble initiating swallow.  Consider a modified barium swallow study for further assessment.  5. To and fro passage of contrast within the esophagus and from the hiatal hernia back into the esophagus.  6. Degenerative changes of the cervical spine.    Assessment:     Karyn Bobby is a 44 y.o. female with PMH Crohn's disease, depression and GERD who presents to clinic for evaluation of recurrent hiatal hernia.    Plan:     - Order modified barium swallow  - Obtain dynamic gastric emptying scintigraphy   - Plan for robotic redo of hiatal hernia with fundoplication with  endoflip   - Patient should be on bariatric liquid diet for one week prior to operation    Katie Farfan, MS4  [facility]         [1]   Social History  Socioeconomic History    Marital status:     Years of education: 12+   Tobacco Use    Smoking status: Former     Current packs/day: 0.00     Types: Cigarettes     Start date: 3/23/2000     Quit date: 3/23/2001     Years since quittin.1    Smokeless tobacco: Never   Substance and Sexual Activity    Alcohol use: Not Currently    Drug use: No    Sexual activity: Yes     Partners: Male     Birth control/protection: Surgical, None     Comment: Essure     Social Drivers of Health     Financial Resource Strain: Medium Risk (2025)    Overall Financial Resource Strain (CARDIA)     Difficulty of Paying Living Expenses: Somewhat hard   Food Insecurity: Food Insecurity Present (2025)    Hunger Vital Sign     Worried About Running Out of Food in the Last Year: Sometimes true     Ran Out of Food in the Last Year: Sometimes true   Transportation Needs: Unmet Transportation Needs (2025)    PRAPARE - Transportation     Lack of Transportation (Medical): Yes     Lack of Transportation (Non-Medical): Yes   Physical Activity: Sufficiently Active (2025)    Exercise Vital Sign     Days of Exercise per Week: 5 days     Minutes of Exercise per Session: 120 min   Stress: Stress Concern Present (2025)    [employer] [place] [employer] - Occupational Stress Questionnaire     Feeling of Stress : Very much   Housing Stability: High Risk (2025)    Housing Stability Vital Sign     Unable to Pay for Housing in the Last Year: Yes     Homeless in the Last Year: No   [2]   Current Outpatient Medications on File Prior to Visit   Medication Sig Dispense Refill    adalimumab (HUMIRA PEN) PnKt injection INJECT CONTENTS OF 1 PEN (40MG) EVERY 14 DAYS AS DIRECTED 2 pen 3    aspirin-acetaminophen-caffeine 250-250-65 mg (EXCEDRIN MIGRAINE)  250-250-65 mg per tablet Take 1 tablet by mouth every 6 (six) hours as needed for Pain.      azaTHIOprine (IMURAN) 50 mg Tab Take 2 tablets (100 mg total) by mouth once daily. 180 tablet 3    busPIRone (BUSPAR) 5 MG Tab Take 1 tablet (5 mg total) by mouth 2 (two) times daily. 60 tablet 5    butalbital-acetaminophen-caffeine -40 mg (FIORICET, ESGIC) -40 mg per tablet Take 1 tablet by mouth every 4 (four) hours as needed for Headaches. 12 tablet 0    citalopram (CELEXA) 40 MG tablet TAKE 1 TABLET (40 MG TOTAL) BY MOUTH ONCE DAILY. 90 tablet 3    ibuprofen (ADVIL,MOTRIN) 600 MG tablet Take 1 tablet (600 mg total) by mouth every 6 (six) hours as needed for Pain. 20 tablet 0    naproxen (NAPROSYN) 500 MG tablet Take 1 tablet (500 mg total) by mouth 2 (two) times daily with meals. 30 tablet 0    [DISCONTINUED] ondansetron (ZOFRAN-ODT) 8 MG TbDL Take 1 tablet (8 mg total) by mouth every 8 (eight) hours as needed (Nausea/vomiting). 12 tablet 0    albuterol (PROVENTIL/VENTOLIN HFA) 90 mcg/actuation inhaler INHALE 1-2 PUFFS INTO THE LUNGS EVERY 6 (SIX) HOURS AS NEEDED FOR SHORTNESS OF BREATH. RESCUE 18 g 0    EPINEPHrine (EPIPEN) 0.3 mg/0.3 mL AtIn Inject 0.3 mLs (0.3 mg total) into the muscle once. For signs of anaphylaxis emergency for 1 dose 2 each 0    [DISCONTINUED] pantoprazole (PROTONIX) 40 MG tablet Take 1 tablet (40 mg total) by mouth 2 (two) times daily before meals. 60 tablet 3     No current facility-administered medications on file prior to visit.

## 2025-05-27 NOTE — LETTER
May 27, 2025        Connor Clements II, NP  [address]  [place] [address]             [facility]  [address]  [facility] [address]  Phone: [phone]   Patient: Karyn Bobby   MR Number: [id number]   YOB: 1980   Date of Visit: 5/27/2025       Dear Dr. Clements:    Thank you for referring Karyn Bobby to me for evaluation. Attached you will find relevant portions of my assessment and plan of care.    If you have questions, please do not hesitate to call me. I look forward to following Karyn Bobby along with you.    Sincerely,      Evan Bustillos MD            CC  No Recipients    Enclosure

## 2025-06-05 ENCOUNTER — TELEPHONE (OUTPATIENT)
Dept: SURGERY | Facility: CLINIC | Age: 45
End: 2025-06-05
Payer: MEDICARE

## 2025-06-05 DIAGNOSIS — K22.70 BARRETT'S ESOPHAGUS WITHOUT DYSPLASIA: ICD-10-CM

## 2025-06-05 DIAGNOSIS — K21.9 HIATAL HERNIA WITH GERD: Primary | ICD-10-CM

## 2025-06-05 DIAGNOSIS — Z01.818 PRE-OP TESTING: Primary | ICD-10-CM

## 2025-06-05 DIAGNOSIS — K44.9 HIATAL HERNIA WITH GERD: Primary | ICD-10-CM

## 2025-06-09 ENCOUNTER — TELEPHONE (OUTPATIENT)
Dept: SURGERY | Facility: CLINIC | Age: 45
End: 2025-06-09
Payer: MEDICARE

## 2025-06-09 NOTE — TELEPHONE ENCOUNTER
Spoke to patient and advised no more Humira injections until after surgery  Pt verbalized understanding to all and has no further questions at this time.

## 2025-06-09 NOTE — TELEPHONE ENCOUNTER
----- Message from Med Assistant Fernandez sent at 6/9/2025 10:05 AM CDT -----  Regarding: RE: How long to hold Humira before surgery on 6/27/25  Good Morning,The patient can stay on Humira per Dr Castellon .GI clinic  ----- Message -----  From: Shirley Roberts RN  Sent: 6/9/2025   9:19 AM CDT  To: Shirley Roberts RN; Cande Blankenship Staff  Subject: FW: How long to hold Humira before surgery o#    Following up on this very important message as the timeline is running short.  How long does she have to hold the Humira prior to surgery.  Her surgery is scheduled for 6/27/25 and I am concerned if we have to hold it for 2 weeks, we will be out of time at this point.Could someone please let me know, so I can keep the date or reschedule if needed.Thank you for your immediate attention to this!Tyson Choudhury [facility] [id number]  ----- Message -----  From: Shirley Roberts RN  Sent: 6/5/2025   8:35 AM CDT  To: Shirley Roberts RN; Cande Blankenship Staff  Subject: How long to hold Humira before surgery on 6/#    Good morning,Ms. Bobby appears to be on Humira and she is scheduled for a surgical procedure on 6/27/25.  Please advise how far in advance this medication needs to be stopped and when it can be restarted.As always, please feel free to reach out to me through the portal should you have any questions.Tyson Choudhury [facility] [id number]

## 2025-06-10 ENCOUNTER — TELEPHONE (OUTPATIENT)
Dept: SPEECH THERAPY | Facility: HOSPITAL | Age: 45
End: 2025-06-10
Payer: MEDICARE

## 2025-06-10 ENCOUNTER — RESULTS FOLLOW-UP (OUTPATIENT)
Dept: SURGERY | Facility: CLINIC | Age: 45
End: 2025-06-10
Payer: MEDICARE

## 2025-06-10 DIAGNOSIS — R79.89 ABNORMAL CBC: Primary | ICD-10-CM

## 2025-06-10 NOTE — TELEPHONE ENCOUNTER
Sw pt to inform of scheduled mbss 6/24@8am. Informed to fast 2hrs before test, [facility] radiology clinic.

## 2025-06-13 ENCOUNTER — HOSPITAL ENCOUNTER (OUTPATIENT)
Dept: RADIOLOGY | Facility: HOSPITAL | Age: 45
Discharge: HOME | End: 2025-06-13
Attending: SURGERY
Payer: MEDICARE

## 2025-06-13 ENCOUNTER — RESULTS FOLLOW-UP (OUTPATIENT)
Dept: SURGERY | Facility: CLINIC | Age: 45
End: 2025-06-13
Payer: MEDICARE

## 2025-06-13 DIAGNOSIS — K21.9 HIATAL HERNIA WITH GERD: ICD-10-CM

## 2025-06-13 DIAGNOSIS — R79.89 ABNORMAL CBC: Primary | ICD-10-CM

## 2025-06-13 DIAGNOSIS — K44.9 HIATAL HERNIA WITH GERD: ICD-10-CM

## 2025-06-13 PROCEDURE — 78264 GASTRIC EMPTYING IMG STUDY: CPT | Mod: 26,,, | Performed by: NUCLEAR MEDICINE

## 2025-06-13 PROCEDURE — A9541 TC99M SULFUR COLLOID: HCPCS | Performed by: SURGERY

## 2025-06-13 PROCEDURE — 78264 GASTRIC EMPTYING IMG STUDY: CPT | Mod: TC

## 2025-06-13 RX ORDER — TECHNETIUM TC 99M SULFUR COLLOID 2 MG
1 KIT MISCELLANEOUS
Status: COMPLETED | OUTPATIENT
Start: 2025-06-13 | End: 2025-06-13

## 2025-06-13 RX ADMIN — TECHNETIUM TC 99M SULFUR COLLOID KIT 1 MILLICURIE: KIT at 08:06

## 2025-06-13 NOTE — PROGRESS NOTES
Abnormal.  Suspect anemia of chronic disease.  Will refer to heme/onc, eval not needed before surgery.  Notified via [employer]Book[employer].

## 2025-06-19 DIAGNOSIS — R11.0 NAUSEA: ICD-10-CM

## 2025-06-20 RX ORDER — ONDANSETRON 8 MG/1
8 TABLET, ORALLY DISINTEGRATING ORAL EVERY 8 HOURS PRN
Qty: 60 TABLET | Refills: 3 | OUTPATIENT
Start: 2025-06-20

## 2025-06-23 NOTE — PROGRESS NOTES
[facility]  MODIFIED BARIUM SWALLOW STUDY      Date: 6/24/2025     Name: Karyn Bobby   MRN: [id number]    Therapy Diagnosis: WFL oral and pharyngeal phases of the swallow    Physician: Evan Bustillos,*  Physician Orders: SLP Video Swallow  Medical Diagnosis from Referral: Ferris's esophagus without dysplasia [K22.70], Hiatal hernia with GERD [K44.9, K21.9]     Date of Evaluation:  6/24/2025     Time In:  0800  Time Out:  0830  Total Billable Time: 30     Procedure Min.   Swallow and Oral Function Evaluation   15   Fl Modified Barium Swallow Speech  15     Precautions: Standard    Subjective   Date of Onset: 2-3 years ago    Past Medical History: Karyn Bobby  has a past medical history of ACL (anterior cruciate ligament) rupture, Anxiety, Arthritis, Carpal tunnel syndrome, Contact lens/glasses fitting, Crohn's disease, Depression, Esophagitis, GERD (gastroesophageal reflux disease), Joint pain, Migraine headache, Patella fracture, PVC (premature ventricular contraction), and STD (sexually transmitted disease) (2001).  Karyn Bobby  has a past surgical history that includes e sure (2012); Endometrial ablation; Lipoma resection; Colonoscopy; Colonoscopy (N/A, 3/23/2018); Knee arthroscopy w/ meniscectomy (Right, 5/29/2018); Arthroscopic repair of anterior cruciate ligament (Right, 5/29/2018); Colonoscopy (N/A, 11/20/2019); Open reduction and internal fixation (ORIF) of fracture of patella (Right, 7/10/2020); Hardware Removal (Right, 10/15/2020); Esophagogastroduodenoscopy (N/A, 10/19/2021); Colonoscopy (N/A, 10/19/2021); Laparoscopic cholecystectomy (N/A, 4/6/2022); Laparoscopic drainage of cyst of ovary (Right, 12/14/2022); Examination under anesthesia (N/A, 12/14/2022); Esophageal manometry with measurement of impedance (N/A, 5/1/2023); Esophagogastroduodenoscopy (N/A, 5/8/2023); ph monitoring, esophagus, wireless, (off reflux meds) (N/A, 5/8/2023); Laparoscopic Toupet  fundoplication (N/A, 6/27/2023); repair, hernia, hiatal, laparoscopic (N/A, 6/27/2023); Colonoscopy (N/A, 6/24/2024); and Esophagogastroduodenoscopy (N/A, 2/26/2025).    The patient is a 44 y.o. female who complains of delayed initiation of swallow and occasional pain with swallowing. Patient denies recent PNA, drooling, or unintentional weight loss. Patient further reports history of GERD with no relief among a variety of attempts to control. Patient reports vomiting will occur a couple times a day on average.      -Current diet at home: full oral - regular/thin liquids  -Recommended diet from previous study: n/a  -Therapy received: n/a     In consideration of the interrelationships between body systems and swallowing, History was provided by patient and/or taken from chart review. The following are medical conditions present in the patient's history which can result in or be attributed to dysphagia:  Respiratory None noted in this category   Cardiovascular None noted in this category   Digestive None noted in this category   Infections  None noted in this category   Urinary None noted in this category   Endocrine None noted in this category   Nervous None noted in this category   Skeletal None noted in this category   Immune None noted in this category   Cancer None noted in this category   Psychiatric  None noted in this category   Congenital  None noted in this category   Other None noted in this category     The following observations were made:   -Mental status: Alert and Cooperative  -Factors affecting performance: no difficulties participating in the study  -Feeding Method: independent in self-feeding    Respiratory Status:   -Respiratory Status: room air    Medical Hx and Allergies:    Review of patient's allergies indicates:   Allergen Reactions    Iodinated contrast media     Methocarbamol Itching    Percocet [oxycodone-acetaminophen] Nausea Only    Shellfish containing products Hives       Pain Scale:   0/10 on VAS currently.   Pain Location: no pain indicated across study    Relevant recent physician/provider appointments:  Patient last seen with Dr. Bustillos (General Surgery - referring physician) on 5/27/2025. Per that appointment note:  45y/o with bmi 37, depression, diarrhea, muscle weakness, impaired motility, crohn's disease on medications, s/p lap hh with toupet 2023 (posterior closure with suture) with recurrence.  S/p lap ovarian surgery, s/p lap krystle.  She did well initially with her hh repair but developed nausea and vomiting and also has heartburn.  She also has dysphagia.  She has symptoms despite zofran 1-2 times a day.  She is not on ppi as it doesn't help.  She is a stay at home mom.  Cbc, cmp reviewed, ok  Ugi reviewed, films viewed, moderate hh with regurgitation and dysphagia.  Other findings in report.  Suggest modified bas.  Egd reviewed, gastritis, moderate hh  Preop hh repair  motility and ph showed, poor motility and positive reflux  EKG 2023 reviewed    -Nonspecific T wave abnormality     -Prolonged QT   Recurrent hh with dysphagia.  It is suggested she obtain mbas (as suggested on ugi) and she can follow up with her pcp.  Obtain dynamic ges.  For robotic redo hh with fundoplication with endoflip.  1 week prop liquid bariatric liquid diet preop.  Pcp clearance. She can follow up with weight loss with her pcp.    Recent imaging:  Narrative & Impression  EXAMINATION:  FL ESOPHAGRAM COMPLETE  CLINICAL HISTORY:  Diaphragmatic hernia without obstruction or gangrene  TECHNIQUE:  Contrast material: Barium sulfate suspension  Fluoroscopy time: 1.6 minutes  Total DAP: 28.727 Gy per cm 2  COMPARISON:  None  FINDINGS:  Swallowing: The patient had mild trouble initiating swallow.  Consider a modified barium swallow study for further assessment.  Esophagus: Esophageal dysmotility as evidenced by poor propagation of the primary wave with stasis of contrast and tertiary contractions.  A moderate-sized  "sliding-type hiatal hernia was present.  A mild Schatzki's ring was detected.  Gastroesophageal reflux: Contrast passed from the hiatal hernia back into the esophagus.  However, no definite contrast was identified extending from the stomach beneath the diaphragm back into the hiatal hernia or esophagus above the diaphragm.  Other findings: Degenerative changes of the cervical spine are noted including bulky anterior marginal osteophyte formation at C5-6. surgical clips are noted overlying the right upper quadrant.  Impression:  1. Esophageal dysmotility.  2. Moderate-sized sliding-type hiatal hernia.  3. Mild Schatzki's ring.  4. The patient had mild trouble initiating swallow.  Consider a modified barium swallow study for further assessment.  5. To and fro passage of contrast within the esophagus and from the hiatal hernia back into the esophagus.  6. Degenerative changes of the cervical spine.  This report was flagged in Epic as abnormal  Electronically signed by:Fátima Yousif MD  Date:                                            02/20/2025  Time:                                           12:30    Objective     Modified Barium Swallow Study  Purpose: to evaluate anatomy and physiology of the oropharyngeal swallow, to determine effectiveness of rehabilitation strategies, and to determine diet consistency and intervention recommendations. The study was performed using the "Gold Standard" of 30 fps with as low as reasonably achievable (ALARA) exposure.     The patient was seen in radiology seated in High Woodall's position in a video imaging chair for lateral views of the larynx and an A/P view. The study was conducted using Varibar thin liquid (IDDSI 0), Varibar nectar liquid (IDDSI 2), Varibar pudding (IDDSI 4), solid coated in Varibar pudding (IDDSI 7), and barium table with water. She tolerated the procedure well.     A cranial nerve examination revealed the following:  Cranial Nerve Examination  Cranial Nerve " 5: Trigeminal Nerve  Motor Jaw Posture at rest: Closed  Mandible Elevation/Depression: WFL  Mandible lateralization: WFL  Abnormal movement: absent Interpretation: Intact bilaterally    Sensory Forehead: WFL  Cheek: WFL  Jaw: WFL  Facial Pain: None noted Interpretation: Intact bilaterally      Cranial Nerve 7: Facial Nerve  Motor Facial Symmetry: WNL  Wrinkle Forehead: WFL  Close eyes tightly: WFL  Labial Protrusion: WFL  Labial Retraction: WFL  Buccal Strength with Labial Seal: WFL  Abnormal movement: absent Interpretation: Intact bilaterally    Sensory Formal testing not completed.       Cranial Nerves IX and X: Glossopharyngeal and Vagus Nerves  Motor Palatal Symmetry (Rest): WNL  Palatal Symmetry (Movement): WNL  Cough: Perceptually strong  Voice Prior to PO intake: Clear  Resonance: Normal  Abnormal movement: absent Interpretation: Intact bilaterally      Cranial Nerve XII: Hypoglossal Nerve  Motor Tongue at rest: WNL  Lingual Protrusion: WNL  Lingual Protrusion against Resistance: WNL  Lingual Lateralization: WNL  Abnormal movement: absent Interpretation: Intact bilaterally      Other information:  Volitional Swallow: Able to palpate laryngeal rise  Mucosal Quality: No abnormal findings  Secretion Management: no overt deficits noted/observed  Dentition: Good condition for speech and mastication       CONSISTENCIES ADMINISTERED:    Consistency  Findings Rosenbeck's Penetration/Aspiration Scale (PAS) Strategy Attempted    Thin (IDDSI 0)    Self-regulated cup sip x2   Consecutive cup sip x1   Self-regulated straw sip x3    Oral phase: trace residue noted on the tongue/base of tongue which was  reduced by dry swallow and premature spillage to the pyriform sinuses    Pharyngeal phase: none to trace residue noted in the vallecula which was  reduced by dry swallow and no pyriform sinus residue noted    Esophageal screen: retrograde flow of bolus below the UES observed and delayed emptying/retention of bolus  Best/Worst: (1) Material does not enter the airway   No strategies completed or needed   Nectar thick (mildly thick/IDDSI 2)    5ml x1  10ml x1 Oral phase: trace to mild residue noted on the tongue/base of tongue which was reduced by dry swallow and premature spillage to the pyriform sinuses    Pharyngeal phase: none to trace residue noted in the vallecula which was reduced by dry swallow and no pyriform sinus residue noted   Best/Worst: (1) Material does not enter the airway   No strategies completed or needed   Puree (extremely thick/ IDDSI 4)    5ml x1  10ml x2   Oral phase: none to trace residue noted on the tongue/base of tongue which was reduced by dry swallow and premature spillage to the valleculae    Pharyngeal phase: none to trace residue noted in the vallecula which was reduced by dry swallow and no pyriform sinus residue noted   Best/Worst: (1) Material does not enter the airway   No strategies completed or needed   Solid (regular/ IDDSI 7)    - bite of cookie x3 Oral phase: trace to mild residue noted on the tongue/base of tongue which was  reduced by dry swallow    Pharyngeal phase: WFL    Esophageal screen: WFL Best/Worst: (1) Material does not enter the airway   No strategies completed or needed   Barium tablet     -Tablet administered in water Timely and efficient oropharyngeal clearance   (1) Material does not enter the airway No strategies completed or needed       Treatment   Total Treatment Time: n/a  Patient educated regarding results and recommendations of the evaluation. See the recommendations section below.    Education: Plan of Care, role of SLP in care, and anatomy and physiology of swallow mechanism as it relates to MBSS findings and recommendations were discussed with the patient. Patient expressed understanding. All questions were answered.     Assessment     Karyn Bobby is a 44 y.o. female referred for Modified Barium Swallow Study with a medical diagnosis of Ferris's esophagus  without dysplasia [K22.70], Hiatal hernia with GERD [K44.9, K21.9] . The patient presents with oropharyngeal function Within Functional Limits as determined by the Dysphagia Outcome and Severity Scale (JUANCARLOS). Level 6: WFL/ Modified [place].    Modified Barium Swallow Study (MBSS) revealed oral phase characterized by adequate lingual and labial strength and range of motion for tongue control, bolus preparation and transport. Lip closure was adequate with no labial escape. Bolus prep and mastication was timely and efficient. Lingual motion was brisk for adequate bolus transport. There was trace to mild oral residue on all consistencies which was eliminated with spontaneous dry. The swallow was initiated when the head of the bolus entered the pyriform sinuses.    Pharyngeal phase characterized by delayed initiation of swallow across consistencies - patient noted with food aversion across consistencies that could have impacted initiation. Piecemeal deglutition noted on various trials of thinm nectar thick liquid, and puree - again, could be impacted due to taste aversion reported. The soft palate elevated for complete of the velopharyngeal port. During pharyngeal swallow, adequate base of tongue retraction, anterior hyoid excursion, laryngeal elevation, and pharyngeal stripping wave resulted in complete epiglottic inversion and UES opening with no  penetration and no aspiration. No significant pharyngeal residue was observed in today's study. Cervical osteophyte noted approximately at level C5-C6.    On esophageal screen, dysmotility, delayed esophageal emptying, and retrograde flow below the pharyngo-esophageal segment  were noted. Further esophageal imaging including EGD and/or barium esophagram as well as follow-up with GI is recommended.    Impressions: Patient presents with WFL oral and pharyngeal phases of the swallow. Noted delay in initiation could stem from taste aversion or referred sensation from  esophageal components/patient's known esophageal history. In consideration of the Dynamic Imaging Grade of Swallowing Toxicity (DIGEST) (Ehsan et al, 2017), patient presents with preserved safety of swallow and preserved efficiency of swallow. Patient appears to be at low risk for aspiration related PNA in consideration of three pillars of aspiration pneumonia (Jhonathan, 2005) including her oral health status, overall health/immune status, and laryngeal vestibule closure/severity of dysphagia. However, unable to assess risk related to aspiration pneumonia cause by the aspiration of gastric content.     Functional Oral Intake Scale (FOIS)  The Functional Oral Intake Scale (FOIS) is an ordinal scale that is used to assess the current status and meaningful change in the oral intake. FOIS levels include:    TUBE DEPENDENT (levels 1-3) 1. No oral intake  2. Tube dependent with minimal/inconsistent oral intake  3. Tube supplements with consistent oral intake      TOTAL ORAL INTAKE (levels 4-7) 4. Total oral intake of a single consistency  5. Total oral intake of multiple consistencies requiring special preparation  6. Total oral intake with no special preparation, but must avoid specific foods or liquid items  7. Total oral intake with no restrictions     Patient is currently judged to be at FOIS level 7.      References:  GEE Ring. (2005, March). Pneumonia: Factors Beyond Aspiration. Perspectives in Swallowing and Swallowing Disorders (Dysphagia), 14, 10-16.  Fidel KA, Saritha MP, Pratik DA, Servando POTTERK, Anabell HY, Caden RS, Vicenta CD, Bro SY, Larry CP, Kali J, Lazarus CL, May A, Zayda J, Tiago JW, Lev HM, William JS. Dynamic Imaging Grade of Swallowing Toxicity (DIGEST): Scale development and validation. Cancer. 2017 Jan 1;123(1):62-70. doi: 10.1002/cncr.96561. Epub 2016 Aug 26. PMID: 55951993; PMCID: [id number].    Recommendations:     Consistency Recommendations: THIN liquids (IDDSI 0) and REGULAR  consistencies (IDDSI 7).  Medications should be taken Whole in thin liquids.   Risk Management: use good oral hygiene , sit upright for all PO intake, increase physical mobility as tolerated, behavioral reflux precautions, alternate bites and sips, small bites and sips, multiple swallows per bolus, allow extra time for meals, remain upright for at least 1-2 hours following any PO intake, and eat small meals throughout the day to reduce discomfort associated with delayed emptying of the esophagus  Specialist Referrals: continue to follow with Gastroenterology   Therapy: Dysphagia therapy is not recommended at this time.  Follow-up exam: Follow up swallow study is not indicated at this time.    Please contact [facility] at [phone] if there are questions re: the above or if we can be of additional service to this patient.    Therapist's Name:   CHAD Lincoln-SLP, CCC-SLP   Speech Language Pathologist    Date: 6/24/2025

## 2025-06-24 ENCOUNTER — HOSPITAL ENCOUNTER (OUTPATIENT)
Dept: RADIOLOGY | Facility: HOSPITAL | Age: 45
Discharge: HOME | End: 2025-06-24
Attending: SURGERY
Payer: MEDICARE

## 2025-06-24 ENCOUNTER — CLINICAL SUPPORT (OUTPATIENT)
Dept: SPEECH THERAPY | Facility: HOSPITAL | Age: 45
End: 2025-06-24
Attending: SURGERY
Payer: MEDICARE

## 2025-06-24 DIAGNOSIS — K22.70 BARRETT'S ESOPHAGUS WITHOUT DYSPLASIA: Primary | ICD-10-CM

## 2025-06-24 DIAGNOSIS — K21.9 HIATAL HERNIA WITH GERD: ICD-10-CM

## 2025-06-24 DIAGNOSIS — K22.70 BARRETT'S ESOPHAGUS WITHOUT DYSPLASIA: ICD-10-CM

## 2025-06-24 DIAGNOSIS — K44.9 HIATAL HERNIA WITH GERD: ICD-10-CM

## 2025-06-24 PROCEDURE — 74230 X-RAY XM SWLNG FUNCJ C+: CPT | Mod: 26,,, | Performed by: RADIOLOGY

## 2025-06-24 PROCEDURE — 74230 X-RAY XM SWLNG FUNCJ C+: CPT | Mod: TC

## 2025-06-24 PROCEDURE — A9698 NON-RAD CONTRAST MATERIALNOC: HCPCS | Performed by: SURGERY

## 2025-06-24 PROCEDURE — 92611 MOTION FLUOROSCOPY/SWALLOW: CPT | Mod: GN

## 2025-06-24 PROCEDURE — 25500020 PHARM REV CODE 255: Performed by: SURGERY

## 2025-06-24 PROCEDURE — 92610 EVALUATE SWALLOWING FUNCTION: CPT | Mod: GN

## 2025-06-24 RX ADMIN — BARIUM SULFATE 60 ML: 0.81 POWDER, FOR SUSPENSION ORAL at 08:06

## 2025-06-24 NOTE — PLAN OF CARE
Assessment     Karyn Bobby is a 44 y.o. female referred for Modified Barium Swallow Study with a medical diagnosis of Ferris's esophagus without dysplasia [K22.70], Hiatal hernia with GERD [K44.9, K21.9] . The patient presents with oropharyngeal function Within Functional Limits as determined by the Dysphagia Outcome and Severity Scale (JUANCARLOS). Level 6: WFL/ Modified [place].    Modified Barium Swallow Study (MBSS) revealed oral phase characterized by adequate lingual and labial strength and range of motion for tongue control, bolus preparation and transport. Lip closure was adequate with no labial escape. Bolus prep and mastication was timely and efficient. Lingual motion was brisk for adequate bolus transport. There was trace to mild oral residue on all consistencies which was eliminated with spontaneous dry. The swallow was initiated when the head of the bolus entered the pyriform sinuses.    Pharyngeal phase characterized by delayed initiation of swallow across consistencies - patient noted with food aversion across consistencies that could have impacted initiation. Piecemeal deglutition noted on various trials of thinm nectar thick liquid, and puree - again, could be impacted due to taste aversion reported. The soft palate elevated for complete of the velopharyngeal port. During pharyngeal swallow, adequate base of tongue retraction, anterior hyoid excursion, laryngeal elevation, and pharyngeal stripping wave resulted in complete epiglottic inversion and UES opening with no  penetration and no aspiration. No significant pharyngeal residue was observed in today's study. Cervical osteophyte noted approximately at level C5-C6.    On esophageal screen, dysmotility, delayed esophageal emptying, and retrograde flow below the pharyngo-esophageal segment  were noted. Further esophageal imaging including EGD and/or barium esophagram as well as follow-up with GI is recommended.    Impressions: Patient presents with  WFL oral and pharyngeal phases of the swallow. Noted delay in initiation could stem from taste aversion or referred sensation from esophageal components/patient's known esophageal history. In consideration of the Dynamic Imaging Grade of Swallowing Toxicity (DIGEST) (Ehsan et al, 2017), patient presents with preserved safety of swallow and preserved efficiency of swallow. Patient appears to be at low risk for aspiration related PNA in consideration of three pillars of aspiration pneumonia (Jhonathan, 2005) including her oral health status, overall health/immune status, and laryngeal vestibule closure/severity of dysphagia. However, unable to assess risk related to aspiration pneumonia cause by the aspiration of gastric content.     Functional Oral Intake Scale (FOIS)  The Functional Oral Intake Scale (FOIS) is an ordinal scale that is used to assess the current status and meaningful change in the oral intake. FOIS levels include:    TUBE DEPENDENT (levels 1-3) 1. No oral intake  2. Tube dependent with minimal/inconsistent oral intake  3. Tube supplements with consistent oral intake      TOTAL ORAL INTAKE (levels 4-7) 4. Total oral intake of a single consistency  5. Total oral intake of multiple consistencies requiring special preparation  6. Total oral intake with no special preparation, but must avoid specific foods or liquid items  7. Total oral intake with no restrictions     Patient is currently judged to be at FOIS level 7.      References:  GEE Ring. (2005, March). Pneumonia: Factors Beyond Aspiration. Perspectives in Swallowing and Swallowing Disorders (Dysphagia), 14, 10-16.  Fidel KA, Saritha MP, Pratik DA, Servando POTTERK, Anabell HY, Caden RS, Vicenta CD, Bro SY, Larry CP, Kali J, Lazarus CL, May A, Zayda J, Tiago JW, Lev HM, William JS. Dynamic Imaging Grade of Swallowing Toxicity (DIGEST): Scale development and validation. Cancer. 2017 Jan 1;123(1):62-70. doi: 10.1002/cncr.19864. Epub 2016 Aug  26. PMID: [id number]; PMCID: [id number].    Recommendations:     Consistency Recommendations: THIN liquids (IDDSI 0) and REGULAR consistencies (IDDSI 7).  Medications should be taken Whole in thin liquids.   Risk Management: use good oral hygiene , sit upright for all PO intake, increase physical mobility as tolerated, behavioral reflux precautions, alternate bites and sips, small bites and sips, multiple swallows per bolus, allow extra time for meals, remain upright for at least 1-2 hours following any PO intake, and eat small meals throughout the day to reduce discomfort associated with delayed emptying of the esophagus  Specialist Referrals: continue to follow with Gastroenterology   Therapy: Dysphagia therapy is not recommended at this time.  Follow-up exam: Follow up swallow study is not indicated at this time.    Please contact [facility] at [phone] if there are questions re: the above or if we can be of additional service to this patient.    Therapist's Name:   JACY Lincoln, CCC-SLP   Speech Language Pathologist    Date: 6/24/2025

## 2025-06-25 ENCOUNTER — ANESTHESIA EVENT (OUTPATIENT)
Dept: SURGERY | Facility: HOSPITAL | Age: 45
End: 2025-06-25
Payer: MEDICARE

## 2025-06-25 NOTE — PRE-PROCEDURE INSTRUCTIONS
Anesthesia Pre-Op Instructions given:     -- YOUR SURGEON'S OFFICE WILL PROVIDE YOUR ARRIVAL TIME  -- Medication information (what to hold and what to take)   -- NPO guidelines as follows: (or as per your Surgeon)  Stop ALL solid food, gum, candy 8 hours before arrival time.  Stop all CLOUDY liquids: coffee with creamer, cloudy juices, 8 hours prior to arrival time.  The patient should be ENCOURAGED to drink carbohydrate-rich clear liquids (sports drinks, clear juices) until 2 hours prior to arrival time.  Stop clear liquids 2 hours prior to arrival time.  CLEAR liquids include water, black coffee NO creamer, clear oral rehydration drinks, clear sports drinks and clear fruit juices (no orange juice, no pulpy juices, no apple cider).   IF IN DOUBT, drink water instead.   NOTHING TO DRINK 2 hours before surgery/procedure time. If you are told to take medication on the morning of surgery, it may be taken with a sip of water.   -- *Arrival place and directions given*.  Time to be given the day before procedure by the Surgeon's Office   -- Bathe with antibacterial soap (dial or Hibiclens as instructed)  -- Don't wear any jewelry or valuables and not metals on skin or hair AM of surgery   -- No makeup or moisturizer to face   -- No perfume/cologne, powder, lotions, aftershave or deodorant     Pt's NPO instructions given by surgeons office. Pt will follow surgeon's office NPO instructions, verified with Pt that there were no further questions at this time. Pt verbalized understanding.           *If going to , see below:      Directions and Instructions for [facility]   At [facility], we have an outstanding team of physicians, anesthesiologists, CRNAs, Registered Nurses, Surgical Technologists, and other ancillary team members all focused on your surgical and procedural care.   Before Your Procedure:   The physician's office will call you with a specific arrival time and directions a  day or two before your scheduled procedure. You may also receive these instructions through your [facility] portal.   Day of Procedure:   Please be sure to arrive at the arrival time given or you may risk your surgery being delayed or canceled. The arrival time is earlier than your scheduled surgery or procedure time. In the winter months please dress warm and bring blankets for you or your child as the waiting room may be cold. If you have difficulty locating the facility, please give us a call at [phone].   Directions:   The [facility] is located on the 1st floor of the hospital building near the [place] entrance.   Parking:   You will park in the [facility] Park[facility] Garage (note location on map). [facility] opens at 5:00 a.m. and has a drop off area by the entrance.  parking is available starting at 7:00 a.m. Please see below for further  parking instructions.   Directions from the parking garage elevators   Blue [facility] Elevators: From the parking garage, take the blue [facility] elevators (located in the center of the parking garage) to the 1st floor of the garage. You will then take a right once off the elevators then another right to the outside of the parking garage. You will be across from the [facility]. You will walk down the sidewalk, pass the  curve at the [place] entrance and continue to follow the sidewalk. You will pass the radiation oncology entrance on your right. Continue to follow the sidewalk to the [facility] glass door entrance.   Hospital Entrance (Inside Route): If a mostly inside route is preferred: Take the inside elevator bank (located at the far north end of the garage) from the parking garage to the 1st floor. On the 1st floor walk past [employer]. Keep walking down the center of the hallway towards the hospital elevators. Once you reach the red brick hong, take a left and go past the hospital elevators.  Take another left and follow the blue and white [facility] signs around the hallway to the end. Go outside of the door. You will see the [facility] [place] entrance to your right.   Drop Off:   There is a drop off area at the doors of the [facility] for your convenience. If utilized for pediatric patients, an adult must accompany the patient into the surgery center while another adult pinto the vehicle.    (at 7:00 a.m.):   Upon check-in, please let the  know that you are utilizing [employer] parking which is free. The . will then call [employer] for your car to be picked up. Your keys and phone number will be collected and given to [employer] services. You will then be given a ticket. Upon discharge, [employer] will be notified to bring your vehicle back when you are ready.           Directions to [facility]:  [phone]     From 1st floor garage elevators: go past \[facility]\"" [employer] shop. Look for Black piano on side of coffee shop. Take Atrium (gold) elevator by piano up to 2nd floor. When you exit elevator follow long hallway (you should see a sign hanging from the ceiling that says Day of Surgery Family Waiting Room. When hallway ends you will be entering the day of surgery waiting area. Check in at the desk for your procedure.      From [facility] entrance: Make a right after you enter the door to the hospital. On your Left, take [place] elevator to 2nd floor. Check in at desk      From Lab desk on 2nd floor: Exit lab area toward hospital atrium. You should see a sign that says Day of Surgery [facility] Waiting Area. Make a Left and follow long hallway (you should see a sign hanging from the ceiling that says Day of Surgery Family Waiting Room. When hallway ends you will be entering the day of surgery waiting area. Check in at the desk for your procedure.

## 2025-06-26 ENCOUNTER — TELEPHONE (OUTPATIENT)
Dept: SURGERY | Facility: CLINIC | Age: 45
End: 2025-06-26
Payer: MEDICARE

## 2025-06-26 NOTE — ANESTHESIA PREPROCEDURE EVALUATION
[facility]  Anesthesia Pre-Operative Evaluation         Patient Name: Karyn Bobby  YOB: 1980  MRN: [id number]    SUBJECTIVE:     Pre-operative evaluation for Procedure(s) (LRB):  XI ROBOTIC REPAIR, HIATAL HERNIA REDO, W/ FUNDOPLICATION w/ possible mesh (N/A)  EGD (ESOPHAGOGASTRODUODENOSCOPY) w/ endoflip (N/A)     06/26/2025    Karyn Bobby is a 44 y.o. female w/ a significant PMHx of hiatal hernia with GERD, obesity, Crohn's disease, and cervical spine radiculopathy    Patient now presents for the above procedure(s).      LDA:      Prev airway:   Date/Time: 12/14/2022 8:41 AM  Intubation:     Induction:  Intravenous    Intubated:  Postinduction    Mask Ventilation:  Easy with oral airway    Attempts:  1    Attempted By:  CHASE    Method of Intubation:  Direct    Blade:  Esme 3    Laryngeal View Grade: Grade I - full view of cords      Difficult Airway Encountered?: No      Complications:  None    Airway Device:  Oral endotracheal tube    Airway Device Size:  7.5      Complicating Factors:  None    Findings Post-Intubation:  BS equal bilateral and atraumatic/condition of teeth unchanged    Date/Time: 4/6/2022 10:07 AM     Induction:  Intravenous    Intubated:  Postinduction    Mask Ventilation:  Easy mask    Attempts:  1    Attempted By:  Student    Method of Intubation:  Direct    Blade:  Other (see comments) (vargas 2)    Laryngeal View Grade: Grade I - full view of cords      Difficult Airway Encountered?: No      Complications:  None    Airway Device:  Oral endotracheal tube    Airway Device Size:  7.0    Placement Verified By:  Capnometry    Complicating Factors:  Anterior larynx and small mouth    Findings Post-Intubation:  BS equal bilateral and atraumatic/condition of teeth unchanged      Drips: None documented.    Problem List[1]    Review of patient's allergies indicates:   Allergen Reactions    Iodinated contrast media     Methocarbamol Itching    Percocet  [oxycodone-acetaminophen] Nausea Only    Shellfish containing products Hives       Current Outpatient Medications:  Current Medications[2]    Past Surgical History:   Procedure Laterality Date    ARTHROSCOPIC REPAIR OF ANTERIOR CRUCIATE LIGAMENT Right 5/29/2018    Procedure: REPAIR ANTERIOR CRUCIATE LIGAMENT-ARTHROSCOPICALLY AIDED;  Surgeon: Mal Vogel MD;  Location: [facility] OR;  Service: Orthopedics;  Laterality: Right;    COLONOSCOPY      20's    COLONOSCOPY N/A 3/23/2018    Procedure: COLONOSCOPY;  Surgeon: Brianne Willard MD;  Location: [facility];  Service: Endoscopy;  Laterality: N/A;    COLONOSCOPY N/A 11/20/2019    Procedure: COLONOSCOPY;  Surgeon: Benson Alvarado MD;  Location: [facility];  Service: Endoscopy;  Laterality: N/A;    COLONOSCOPY N/A 10/19/2021    Procedure: COLONOSCOPY;  Surgeon: Brianne Willard MD;  Location: [facility];  Service: Endoscopy;  Laterality: N/A;  Patient may be a few minutes late has to put daughter on bus    COLONOSCOPY N/A 6/24/2024    Procedure: COLONOSCOPY;  Surgeon: Brianne Willard MD;  Location: [facility];  Service: Endoscopy;  Laterality: N/A;    e sure  2012    ENDOMETRIAL ABLATION      ESOPHAGEAL MANOMETRY WITH MEASUREMENT OF IMPEDANCE N/A 5/1/2023    Procedure: MANOMETRY, ESOPHAGUS, WITH IMPEDANCE MEASUREMENT;  Surgeon: Lexi Lewis MD;  Location: [facility] ([address] [place] [address]);  Service: Endoscopy;  Laterality: N/A;  instructions sent to [facility]    ESOPHAGOGASTRODUODENOSCOPY N/A 10/19/2021    Procedure: EGD (ESOPHAGOGASTRODUODENOSCOPY);  Surgeon: Brianne Willard MD;  Location: [facility];  Service: Endoscopy;  Laterality: N/A;    ESOPHAGOGASTRODUODENOSCOPY N/A 5/8/2023    Procedure: ESOPHAGOGASTRODUODENOSCOPY (EGD), WITH BRAVO;  Surgeon: Karen Lennon MD;  [facility]  Service: Endoscopy;  Laterality: N/A;  instructions sent to [facility]  preop call no answer BP    ESOPHAGOGASTRODUODENOSCOPY N/A 2/26/2025     Procedure: EGD (ESOPHAGOGASTRODUODENOSCOPY);  Surgeon: Joe Michaud MD;  Location: [facility];  Service: General;  Laterality: N/A;    EXAMINATION UNDER ANESTHESIA N/A 12/14/2022    Procedure: EXAM UNDER ANESTHESIA;  Surgeon: Jordi Samaniego MD;  Location: [facility] beds at the [facility];  Service: OB/GYN;  Laterality: N/A;    HARDWARE REMOVAL Right 10/15/2020    Procedure: REMOVAL OF HARDWARE PATELLA - [place];  Surgeon: Troy Ferrell MD;  Location: [facility] 234 beds at the [facility];  Service: Orthopedics;  Laterality: Right;    KNEE ARTHROSCOPY W/ MENISCECTOMY Right 5/29/2018    Procedure: ARTHROSCOPY-MENISCECTOMY;  Surgeon: Mal Vogel MD;  Location: [facility];  Service: Orthopedics;  Laterality: Right;    LAPAROSCOPIC CHOLECYSTECTOMY N/A 4/6/2022    Procedure: CHOLECYSTECTOMY, LAPAROSCOPIC;  Surgeon: Joe Michaud MD;  Location: [facility] 234 beds at the [facility];  Service: General;  Laterality: N/A;    LAPAROSCOPIC DRAINAGE OF CYST OF OVARY Right 12/14/2022    Procedure: DRAINAGE, CYST, OVARY, LAPAROSCOPIC;  Surgeon: Jordi Samaniego MD;  Location: [facility] 234 beds at the [facility];  Service: OB/GYN;  Laterality: Right;    LAPAROSCOPIC TOUPET FUNDOPLICATION N/A 6/27/2023    Procedure: FUNDOPLICATION, LAPAROSCOPIC, TOUPET;  Surgeon: Thomas Díaz MD;  Location: [facility] 234 beds at the [facility];  Service: General;  Laterality: N/A;    LIPOMA RESECTION      OPEN REDUCTION AND INTERNAL FIXATION (ORIF) OF FRACTURE OF PATELLA Right 7/10/2020    Procedure: ORIF, FRACTURE, PATELLA;  Surgeon: Troy Ferrell MD;  Location: [facility] 234 beds at the [facility];  Service: Orthopedics;  Laterality: Right;    PH MONITORING, ESOPHAGUS, WIRELESS, (OFF REFLUX MEDS) N/A 5/8/2023    Procedure: PH MONITORING, ESOPHAGUS, WIRELESS, (OFF REFLUX MEDS);  Surgeon: Karen Lennon MD;  Location: [address]);  Service: Endoscopy;  Laterality: N/A;  48 Hour  Off PPI/H2 Blocker  No allergy or sensitivity to metal/jewelry per pt-KPvt    REPAIR, HERNIA, HIATAL, LAPAROSCOPIC N/A 6/27/2023    Procedure: REPAIR, HERNIA, HIATAL, LAPAROSCOPIC;  Surgeon: Thomas Díaz MD;  Location:  [facility] OR;  Service: General;  Laterality: N/A;       Social History[3]    OBJECTIVE:     Vital Signs Range (Last 24H):         Significant Labs:  Lab Results   Component Value Date    WBC 8.92 06/09/2025    HGB 13.8 06/09/2025    HCT 43.6 06/09/2025     06/09/2025    CHOL 155 11/27/2024    TRIG 190 (H) 11/27/2024    HDL 60 11/27/2024    ALT 12 03/22/2025    AST 17 03/22/2025     06/09/2025    K 3.8 06/09/2025     06/09/2025    CREATININE 0.8 06/09/2025    BUN 23 (H) 06/09/2025    CO2 28 06/09/2025    TSH 3.466 11/27/2024    HGBA1C 5.4 11/27/2024       Diagnostic Studies: No relevant studies.    EKG:   Results for orders placed or performed during the hospital encounter of 07/25/23   EKG 12-lead    Collection Time: 07/25/23  4:33 PM    Narrative    Test Reason : E87.6,    Vent. Rate : 080 BPM     Atrial Rate : 080 BPM     P-R Int : 134 ms          QRS Dur : 086 ms      QT Int : 408 ms       P-R-T Axes : 042 083 036 degrees     QTc Int : 470 ms    Normal sinus rhythm  Nonspecific T wave abnormality  Prolonged QT  When compared with ECG of 13-MAR-2023 09:59,  T wave inversion no longer evident in Anterior leads  Confirmed by Chano Gutierrez MD ([id number]) on 7/26/2023 10:07:26 AM    Referred By: [email or web address]   SELF           Confirmed By:Chano Gutierrez MD       2D ECHO:  TTE:  No results found. However, due to the size of the patient record, not all encounters were searched. Please check Results Review for a complete set of results.    KANDACE:  No results found. However, due to the size of the patient record, not all encounters were searched. Please check Results Review for a complete set of results.    ASSESSMENT/PLAN:          Pre-op Assessment    I have reviewed the Patient Summary Reports.     I have reviewed the Nursing Notes. I have reviewed the NPO Status.   I have reviewed the Medications.     Review of Systems  Anesthesia Hx:  No problems with previous Anesthesia   History of prior surgery of interest  to airway management or planning: nissen fundoplication. Previous anesthesia: General, MAC       Airway issues documented on chart review include mask, easy, GETA, easy direct laryngoscopy , view on direct laryngoscopy Grade I    Denies Family Hx of Anesthesia complications.    Denies Personal Hx of Anesthesia complications.                    Social:  Former Smoker, No Alcohol Use       Hematology/Oncology:  Hematology Normal   Oncology Normal                                   Cardiovascular:     Denies Pacemaker.  Denies Hypertension.   Denies MI.     Denies CABG/stent.           ECG has been reviewed. Normal sinus rhythm  Nonspecific T wave abnormality  Prolonged QT  When compared with ECG of 13-MAR-2023 Patient not on beta blockers                          Pulmonary:    Denies COPD.  Denies Asthma.     PRN albuterol for wheezing but no formal diagnosis of asthma               Hepatic/GI:    Hiatal Hernia, GERD, poorly controlled    Not Taking GLP-1 Agonists            Musculoskeletal:         Spine Disorders: (bilateral hand numbness, neck pain) cervical Degenerative disease           Neurological:    Denies CVA.   Headaches (chronic migraines) Denies Seizures.                                Endocrine:  Denies Diabetes.         Obesity / BMI > 30  Psych:   anxiety                 Physical Exam  General: Well nourished, Alert, Cooperative and Oriented    Airway:  Mallampati: IV / IV/ III  Mouth Opening: Small, but > 3cm  TM Distance: Normal  Tongue: Normal  Neck ROM: Extension Painful, Extension Decreased    Dental:  Intact    Chest/Lungs:  Clear to auscultation    Heart:  Rate: Normal  Rhythm: Regular Rhythm        Anesthesia Plan  Type of Anesthesia, risks & benefits discussed:    Anesthesia Type: Gen ETT, Regional  Intra-op Monitoring Plan: Standard ASA Monitors  Post Op Pain Control Plan: multimodal analgesia, IV/PO Opioids PRN and peripheral nerve block  Induction:  IV  Airway Plan: Direct and Video,  Post-Induction  Informed Consent: Informed consent signed with the Patient and all parties understand the risks and agree with anesthesia plan.  All questions answered.   ASA Score: 2  Day of Surgery Review of History & Physical: H&P Update referred to the surgeon/provider.    Ready For Surgery From Anesthesia Perspective.     .           [1]   Patient Active Problem List  Diagnosis    Rupture of anterior cruciate ligament of knee, subsequent encounter    Diarrhea    Intractable vomiting with nausea    BRBPR (bright red blood per rectum)    Rectal bleeding    Rupture of anterior cruciate ligament of right knee    Muscle weakness    Limited joint range of motion (ROM)    Edema of right lower extremity    Difficulty navigating stairs    Impaired functional mobility, balance, gait, and endurance    Crohn's disease    Closed displaced comminuted fracture of right patella    S/P ORIF (open reduction internal fixation) fracture    Generalized abdominal pain    History of repair of hiatal hernia    Hernia, hiatal   [2] No current facility-administered medications for this encounter.    Current Outpatient Medications:     albuterol (PROVENTIL/VENTOLIN HFA) 90 mcg/actuation inhaler, INHALE 1-2 PUFFS INTO THE LUNGS EVERY 6 (SIX) HOURS AS NEEDED FOR SHORTNESS OF BREATH. RESCUE, Disp: 18 g, Rfl: 0    azaTHIOprine (IMURAN) 50 mg Tab, Take 2 tablets (100 mg total) by mouth once daily., Disp: 180 tablet, Rfl: 3    busPIRone (BUSPAR) 5 MG Tab, Take 1 tablet (5 mg total) by mouth 2 (two) times daily., Disp: 60 tablet, Rfl: 5    citalopram (CELEXA) 40 MG tablet, TAKE 1 TABLET (40 MG TOTAL) BY MOUTH ONCE DAILY., Disp: 90 tablet, Rfl: 3    adalimumab (HUMIRA PEN) PnKt injection, INJECT CONTENTS OF 1 PEN (40MG) EVERY 14 DAYS AS DIRECTED, Disp: 2 pen , Rfl: 3    aspirin-acetaminophen-caffeine 250-250-65 mg (EXCEDRIN MIGRAINE) 250-250-65 mg per tablet, Take 1 tablet by mouth every 6 (six) hours as needed for Pain., Disp: , Rfl:      EPINEPHrine (EPIPEN) 0.3 mg/0.3 mL AtIn, Inject 0.3 mLs (0.3 mg total) into the muscle once. For signs of anaphylaxis emergency for 1 dose, Disp: 2 each, Rfl: 0    ibuprofen (ADVIL,MOTRIN) 600 MG tablet, Take 1 tablet (600 mg total) by mouth every 6 (six) hours as needed for Pain., Disp: 20 tablet, Rfl: 0    ondansetron (ZOFRAN-ODT) 8 MG TbDL, Take 1 tablet (8 mg total) by mouth every 8 (eight) hours as needed (Nausea/vomiting)., Disp: 60 tablet, Rfl: 3  [3]   Social History  Socioeconomic History    Marital status:     Years of education: 12+   Tobacco Use    Smoking status: Former     Current packs/day: 0.00     Types: Cigarettes     Start date: 3/23/2000     Quit date: 3/23/2001     Years since quittin.2    Smokeless tobacco: Never   Substance and Sexual Activity    Alcohol use: Not Currently    Drug use: No    Sexual activity: Yes     Partners: Male     Birth control/protection: Surgical, None     Comment: Essure     Social Drivers of Health     Financial Resource Strain: Medium Risk (2025)    Overall Financial Resource Strain (CARDIA)     Difficulty of Paying Living Expenses: Somewhat hard   Food Insecurity: Food Insecurity Present (2025)    Hunger Vital Sign     Worried About Running Out of Food in the Last Year: Sometimes true     Ran Out of Food in the Last Year: Sometimes true   Transportation Needs: Unmet Transportation Needs (2025)    PRAPARE - Transportation     Lack of Transportation (Medical): Yes     Lack of Transportation (Non-Medical): Yes   Physical Activity: Sufficiently Active (2025)    Exercise Vital Sign     Days of Exercise per Week: 5 days     Minutes of Exercise per Session: 120 min   Stress: Stress Concern Present (2025)    Citizen of [place] of Occupational Health - Occupational Stress Questionnaire     Feeling of Stress : Very much   Housing Stability: High Risk (2025)    Housing Stability Vital Sign     Unable to Pay for Housing in the Last Year:  Yes     Homeless in the Last Year: No

## 2025-06-27 ENCOUNTER — HOSPITAL ENCOUNTER (OUTPATIENT)
Facility: HOSPITAL | Age: 45
Discharge: HOME | End: 2025-06-28
Attending: SURGERY | Admitting: SURGERY
Payer: MEDICARE

## 2025-06-27 ENCOUNTER — ANESTHESIA (OUTPATIENT)
Dept: SURGERY | Facility: HOSPITAL | Age: 45
End: 2025-06-27
Payer: MEDICARE

## 2025-06-27 DIAGNOSIS — K44.9 HIATAL HERNIA: Primary | ICD-10-CM

## 2025-06-27 DIAGNOSIS — K21.9 HIATAL HERNIA WITH GERD: ICD-10-CM

## 2025-06-27 DIAGNOSIS — K44.9 HIATAL HERNIA WITH GERD: ICD-10-CM

## 2025-06-27 DIAGNOSIS — R00.0 TACHYCARDIA: ICD-10-CM

## 2025-06-27 LAB
B-HCG UR QL: NEGATIVE
CTP QC/QA: YES

## 2025-06-27 PROCEDURE — 93010 ELECTROCARDIOGRAM REPORT: CPT | Mod: ,,, | Performed by: INTERNAL MEDICINE

## 2025-06-27 PROCEDURE — 25000003 PHARM REV CODE 250

## 2025-06-27 PROCEDURE — 63600175 PHARM REV CODE 636 W HCPCS: Performed by: COMMUNITY HEALTH WORKER

## 2025-06-27 PROCEDURE — C1726 CATH, BAL DIL, NON-VASCULAR: HCPCS | Performed by: SURGERY

## 2025-06-27 PROCEDURE — 81025 URINE PREGNANCY TEST: CPT | Performed by: SURGERY

## 2025-06-27 PROCEDURE — 91040 ESOPH BALLOON DISTENSION TST: CPT | Mod: 26,,, | Performed by: SURGERY

## 2025-06-27 PROCEDURE — 71000033 HC RECOVERY, INTIAL HOUR: Performed by: SURGERY

## 2025-06-27 PROCEDURE — 25000003 PHARM REV CODE 250: Performed by: COMMUNITY HEALTH WORKER

## 2025-06-27 PROCEDURE — 64468 THRC FASCIAL PLN BLK BI NJX: CPT | Mod: XU,GC,, | Performed by: ANESTHESIOLOGY

## 2025-06-27 PROCEDURE — 71000016 HC POSTOP RECOV ADDL HR: Performed by: SURGERY

## 2025-06-27 PROCEDURE — 64461 PVB THORACIC SINGLE INJ SITE: CPT

## 2025-06-27 PROCEDURE — 71000015 HC POSTOP RECOV 1ST HR: Performed by: SURGERY

## 2025-06-27 PROCEDURE — 63600175 PHARM REV CODE 636 W HCPCS

## 2025-06-27 PROCEDURE — 36000712 HC OR TIME LEV V 1ST 15 MIN: Performed by: SURGERY

## 2025-06-27 PROCEDURE — 37000008 HC ANESTHESIA 1ST 15 MINUTES: Performed by: SURGERY

## 2025-06-27 PROCEDURE — 63600175 PHARM REV CODE 636 W HCPCS: Performed by: ANESTHESIOLOGY

## 2025-06-27 PROCEDURE — 36000713 HC OR TIME LEV V EA ADD 15 MIN: Performed by: SURGERY

## 2025-06-27 PROCEDURE — 94799 UNLISTED PULMONARY SVC/PX: CPT

## 2025-06-27 PROCEDURE — 37000009 HC ANESTHESIA EA ADD 15 MINS: Performed by: SURGERY

## 2025-06-27 PROCEDURE — 93005 ELECTROCARDIOGRAM TRACING: CPT

## 2025-06-27 PROCEDURE — C1768 GRAFT, VASCULAR: HCPCS | Performed by: SURGERY

## 2025-06-27 PROCEDURE — 99900035 HC TECH TIME PER 15 MIN (STAT)

## 2025-06-27 PROCEDURE — 94761 N-INVAS EAR/PLS OXIMETRY MLT: CPT

## 2025-06-27 PROCEDURE — 27000221 HC OXYGEN, UP TO 24 HOURS

## 2025-06-27 PROCEDURE — D9220A PRA ANESTHESIA: Mod: ,,, | Performed by: STUDENT IN AN ORGANIZED HEALTH CARE EDUCATION/TRAINING PROGRAM

## 2025-06-27 PROCEDURE — 94799 UNLISTED PULMONARY SVC/PX: CPT | Mod: MUE

## 2025-06-27 PROCEDURE — 43281 LAP PARAESOPHAG HERN REPAIR: CPT | Mod: ,,, | Performed by: SURGERY

## 2025-06-27 PROCEDURE — 88302 TISSUE EXAM BY PATHOLOGIST: CPT | Mod: TC | Performed by: SURGERY

## 2025-06-27 PROCEDURE — 27201423 OPTIME MED/SURG SUP & DEVICES STERILE SUPPLY: Performed by: SURGERY

## 2025-06-27 DEVICE — BARD® PTFE FELT, 2.5 CM X 15.2 CM
Type: IMPLANTABLE DEVICE | Site: ABDOMEN | Status: FUNCTIONAL
Brand: BARD® PTFE FELT

## 2025-06-27 RX ORDER — FENTANYL CITRATE 50 UG/ML
25 INJECTION, SOLUTION INTRAMUSCULAR; INTRAVENOUS EVERY 5 MIN PRN
Status: DISCONTINUED | OUTPATIENT
Start: 2025-06-27 | End: 2025-06-27 | Stop reason: HOSPADM

## 2025-06-27 RX ORDER — HYDROMORPHONE HYDROCHLORIDE 2 MG/ML
INJECTION, SOLUTION INTRAMUSCULAR; INTRAVENOUS; SUBCUTANEOUS
Status: DISCONTINUED | OUTPATIENT
Start: 2025-06-27 | End: 2025-06-27

## 2025-06-27 RX ORDER — SODIUM CHLORIDE 0.9 % (FLUSH) 0.9 %
10 SYRINGE (ML) INJECTION
Status: DISCONTINUED | OUTPATIENT
Start: 2025-06-27 | End: 2025-06-27 | Stop reason: HOSPADM

## 2025-06-27 RX ORDER — ACETAMINOPHEN 10 MG/ML
1000 INJECTION, SOLUTION INTRAVENOUS EVERY 8 HOURS
Status: DISPENSED | OUTPATIENT
Start: 2025-06-27 | End: 2025-06-28

## 2025-06-27 RX ORDER — ONDANSETRON HYDROCHLORIDE 2 MG/ML
INJECTION, SOLUTION INTRAVENOUS
Status: DISCONTINUED | OUTPATIENT
Start: 2025-06-27 | End: 2025-06-27

## 2025-06-27 RX ORDER — SODIUM CHLORIDE 9 MG/ML
INJECTION, SOLUTION INTRAVENOUS CONTINUOUS
Status: DISCONTINUED | OUTPATIENT
Start: 2025-06-27 | End: 2025-06-27

## 2025-06-27 RX ORDER — HALOPERIDOL LACTATE 5 MG/ML
0.5 INJECTION, SOLUTION INTRAMUSCULAR EVERY 10 MIN PRN
Status: DISCONTINUED | OUTPATIENT
Start: 2025-06-27 | End: 2025-06-27 | Stop reason: HOSPADM

## 2025-06-27 RX ORDER — ENOXAPARIN SODIUM 100 MG/ML
40 INJECTION SUBCUTANEOUS EVERY 24 HOURS
Status: DISCONTINUED | OUTPATIENT
Start: 2025-06-28 | End: 2025-06-28 | Stop reason: HOSPADM

## 2025-06-27 RX ORDER — GLUCAGON 1 MG
1 KIT INJECTION
Status: DISCONTINUED | OUTPATIENT
Start: 2025-06-27 | End: 2025-06-27 | Stop reason: HOSPADM

## 2025-06-27 RX ORDER — ESMOLOL HYDROCHLORIDE 10 MG/ML
INJECTION INTRAVENOUS
Status: DISCONTINUED | OUTPATIENT
Start: 2025-06-27 | End: 2025-06-27

## 2025-06-27 RX ORDER — SCOPOLAMINE 1 MG/3D
1 PATCH, EXTENDED RELEASE TRANSDERMAL
Status: DISCONTINUED | OUTPATIENT
Start: 2025-06-27 | End: 2025-06-28 | Stop reason: HOSPADM

## 2025-06-27 RX ORDER — CEFAZOLIN 2 G/1
INJECTION, POWDER, FOR SOLUTION INTRAMUSCULAR; INTRAVENOUS
Status: DISCONTINUED | OUTPATIENT
Start: 2025-06-27 | End: 2025-06-27

## 2025-06-27 RX ORDER — ONDANSETRON HYDROCHLORIDE 2 MG/ML
4 INJECTION, SOLUTION INTRAVENOUS EVERY 6 HOURS
Status: DISCONTINUED | OUTPATIENT
Start: 2025-06-27 | End: 2025-06-28 | Stop reason: HOSPADM

## 2025-06-27 RX ORDER — CEFAZOLIN 2 G/1
2 INJECTION, POWDER, FOR SOLUTION INTRAMUSCULAR; INTRAVENOUS
Status: DISCONTINUED | OUTPATIENT
Start: 2025-06-27 | End: 2025-06-27 | Stop reason: HOSPADM

## 2025-06-27 RX ORDER — HYDROMORPHONE HYDROCHLORIDE 1 MG/ML
0.5 INJECTION, SOLUTION INTRAMUSCULAR; INTRAVENOUS; SUBCUTANEOUS EVERY 6 HOURS PRN
Status: DISCONTINUED | OUTPATIENT
Start: 2025-06-27 | End: 2025-06-28

## 2025-06-27 RX ORDER — MIDAZOLAM HYDROCHLORIDE 1 MG/ML
.5-4 INJECTION, SOLUTION INTRAMUSCULAR; INTRAVENOUS
Status: DISCONTINUED | OUTPATIENT
Start: 2025-06-27 | End: 2025-06-27 | Stop reason: HOSPADM

## 2025-06-27 RX ORDER — PANTOPRAZOLE SODIUM 40 MG/10ML
40 INJECTION, POWDER, LYOPHILIZED, FOR SOLUTION INTRAVENOUS DAILY
Status: DISCONTINUED | OUTPATIENT
Start: 2025-06-27 | End: 2025-06-28 | Stop reason: HOSPADM

## 2025-06-27 RX ORDER — SODIUM CHLORIDE, SODIUM LACTATE, POTASSIUM CHLORIDE, CALCIUM CHLORIDE 600; 310; 30; 20 MG/100ML; MG/100ML; MG/100ML; MG/100ML
INJECTION, SOLUTION INTRAVENOUS CONTINUOUS
Status: DISCONTINUED | OUTPATIENT
Start: 2025-06-27 | End: 2025-06-27

## 2025-06-27 RX ORDER — EPHEDRINE SULFATE 50 MG/ML
INJECTION, SOLUTION INTRAVENOUS
Status: DISCONTINUED | OUTPATIENT
Start: 2025-06-27 | End: 2025-06-27

## 2025-06-27 RX ORDER — KETAMINE HCL IN 0.9 % NACL 50 MG/5 ML
SYRINGE (ML) INTRAVENOUS
Status: DISCONTINUED | OUTPATIENT
Start: 2025-06-27 | End: 2025-06-27

## 2025-06-27 RX ORDER — SUCCINYLCHOLINE CHLORIDE 20 MG/ML
INJECTION INTRAMUSCULAR; INTRAVENOUS
Status: DISCONTINUED | OUTPATIENT
Start: 2025-06-27 | End: 2025-06-27

## 2025-06-27 RX ORDER — METHOCARBAMOL 100 MG/ML
500 INJECTION, SOLUTION INTRAMUSCULAR; INTRAVENOUS EVERY 6 HOURS
Status: COMPLETED | OUTPATIENT
Start: 2025-06-27 | End: 2025-06-28

## 2025-06-27 RX ORDER — GABAPENTIN 100 MG/1
100 CAPSULE ORAL 3 TIMES DAILY
COMMUNITY

## 2025-06-27 RX ORDER — SUMATRIPTAN 6 MG/.5ML
6 INJECTION, SOLUTION SUBCUTANEOUS ONCE AS NEEDED
Status: COMPLETED | OUTPATIENT
Start: 2025-06-27 | End: 2025-06-27

## 2025-06-27 RX ORDER — ACETAMINOPHEN 500 MG
1000 TABLET ORAL
Status: COMPLETED | OUTPATIENT
Start: 2025-06-27 | End: 2025-06-27

## 2025-06-27 RX ORDER — PROCHLORPERAZINE EDISYLATE 5 MG/ML
5 INJECTION INTRAMUSCULAR; INTRAVENOUS EVERY 6 HOURS PRN
Status: DISCONTINUED | OUTPATIENT
Start: 2025-06-27 | End: 2025-06-28 | Stop reason: HOSPADM

## 2025-06-27 RX ORDER — BUPIVACAINE HYDROCHLORIDE 7.5 MG/ML
INJECTION, SOLUTION EPIDURAL; RETROBULBAR
Status: COMPLETED | OUTPATIENT
Start: 2025-06-27 | End: 2025-06-27

## 2025-06-27 RX ORDER — DEXMEDETOMIDINE HYDROCHLORIDE 100 UG/ML
INJECTION, SOLUTION INTRAVENOUS
Status: DISCONTINUED | OUTPATIENT
Start: 2025-06-27 | End: 2025-06-27

## 2025-06-27 RX ORDER — PROPOFOL 10 MG/ML
VIAL (ML) INTRAVENOUS
Status: DISCONTINUED | OUTPATIENT
Start: 2025-06-27 | End: 2025-06-27

## 2025-06-27 RX ORDER — LIDOCAINE HYDROCHLORIDE 20 MG/ML
INJECTION, SOLUTION EPIDURAL; INFILTRATION; INTRACAUDAL; PERINEURAL
Status: DISCONTINUED | OUTPATIENT
Start: 2025-06-27 | End: 2025-06-27

## 2025-06-27 RX ORDER — ROCURONIUM BROMIDE 10 MG/ML
INJECTION, SOLUTION INTRAVENOUS
Status: DISCONTINUED | OUTPATIENT
Start: 2025-06-27 | End: 2025-06-27

## 2025-06-27 RX ORDER — ACETAMINOPHEN 10 MG/ML
1000 INJECTION, SOLUTION INTRAVENOUS ONCE
Status: COMPLETED | OUTPATIENT
Start: 2025-06-27 | End: 2025-06-27

## 2025-06-27 RX ORDER — DEXAMETHASONE SODIUM PHOSPHATE 4 MG/ML
INJECTION, SOLUTION INTRA-ARTICULAR; INTRALESIONAL; INTRAMUSCULAR; INTRAVENOUS; SOFT TISSUE
Status: DISCONTINUED | OUTPATIENT
Start: 2025-06-27 | End: 2025-06-27

## 2025-06-27 RX ORDER — SODIUM CHLORIDE, SODIUM LACTATE, POTASSIUM CHLORIDE, CALCIUM CHLORIDE 600; 310; 30; 20 MG/100ML; MG/100ML; MG/100ML; MG/100ML
INJECTION, SOLUTION INTRAVENOUS CONTINUOUS
Status: DISCONTINUED | OUTPATIENT
Start: 2025-06-27 | End: 2025-06-28 | Stop reason: HOSPADM

## 2025-06-27 RX ORDER — PHENYLEPHRINE HCL IN 0.9% NACL 1 MG/10 ML
SYRINGE (ML) INTRAVENOUS
Status: DISCONTINUED | OUTPATIENT
Start: 2025-06-27 | End: 2025-06-27

## 2025-06-27 RX ADMIN — SODIUM CHLORIDE: 9 INJECTION, SOLUTION INTRAVENOUS at 06:06

## 2025-06-27 RX ADMIN — SODIUM CHLORIDE, POTASSIUM CHLORIDE, SODIUM LACTATE AND CALCIUM CHLORIDE: 600; 310; 30; 20 INJECTION, SOLUTION INTRAVENOUS at 10:06

## 2025-06-27 RX ADMIN — Medication 150 MCG: at 07:06

## 2025-06-27 RX ADMIN — BUPIVACAINE HYDROCHLORIDE 30 ML: 7.5 INJECTION, SOLUTION EPIDURAL; RETROBULBAR at 06:06

## 2025-06-27 RX ADMIN — MIDAZOLAM 2 MG: 1 INJECTION INTRAMUSCULAR; INTRAVENOUS at 06:06

## 2025-06-27 RX ADMIN — EPHEDRINE SULFATE 10 MG: 50 INJECTION INTRAVENOUS at 08:06

## 2025-06-27 RX ADMIN — LIDOCAINE HYDROCHLORIDE 100 MG: 20 INJECTION, SOLUTION EPIDURAL; INFILTRATION; INTRACAUDAL at 07:06

## 2025-06-27 RX ADMIN — SODIUM CHLORIDE, SODIUM GLUCONATE, SODIUM ACETATE, POTASSIUM CHLORIDE, MAGNESIUM CHLORIDE, SODIUM PHOSPHATE, DIBASIC, AND POTASSIUM PHOSPHATE: .53; .5; .37; .037; .03; .012; .00082 INJECTION, SOLUTION INTRAVENOUS at 08:06

## 2025-06-27 RX ADMIN — ROCURONIUM BROMIDE 10 MG: 10 INJECTION, SOLUTION INTRAVENOUS at 09:06

## 2025-06-27 RX ADMIN — ACETAMINOPHEN 1000 MG: 10 INJECTION, SOLUTION INTRAVENOUS at 08:06

## 2025-06-27 RX ADMIN — SUGAMMADEX 200 MG: 100 INJECTION, SOLUTION INTRAVENOUS at 09:06

## 2025-06-27 RX ADMIN — Medication 10 MG: at 08:06

## 2025-06-27 RX ADMIN — PANTOPRAZOLE SODIUM 40 MG: 40 INJECTION, POWDER, FOR SOLUTION INTRAVENOUS at 10:06

## 2025-06-27 RX ADMIN — EPHEDRINE SULFATE 5 MG: 50 INJECTION INTRAVENOUS at 08:06

## 2025-06-27 RX ADMIN — EPHEDRINE SULFATE 10 MG: 50 INJECTION INTRAVENOUS at 07:06

## 2025-06-27 RX ADMIN — ROCURONIUM BROMIDE 20 MG: 10 INJECTION, SOLUTION INTRAVENOUS at 09:06

## 2025-06-27 RX ADMIN — ONDANSETRON 4 MG: 2 INJECTION INTRAMUSCULAR; INTRAVENOUS at 11:06

## 2025-06-27 RX ADMIN — Medication 100 MCG: at 09:06

## 2025-06-27 RX ADMIN — HYDROMORPHONE HYDROCHLORIDE 0.2 MG: 2 INJECTION INTRAMUSCULAR; INTRAVENOUS; SUBCUTANEOUS at 09:06

## 2025-06-27 RX ADMIN — Medication 20 MG: at 07:06

## 2025-06-27 RX ADMIN — METHOCARBAMOL 500 MG: 100 INJECTION INTRAMUSCULAR; INTRAVENOUS at 05:06

## 2025-06-27 RX ADMIN — CEFAZOLIN 2 G: 2 INJECTION, POWDER, FOR SOLUTION INTRAMUSCULAR; INTRAVENOUS at 07:06

## 2025-06-27 RX ADMIN — DEXAMETHASONE SODIUM PHOSPHATE 4 MG: 4 INJECTION INTRA-ARTICULAR; INTRALESIONAL; INTRAMUSCULAR; INTRAVENOUS; SOFT TISSUE at 07:06

## 2025-06-27 RX ADMIN — FENTANYL CITRATE 25 MCG: 50 INJECTION INTRAMUSCULAR; INTRAVENOUS at 10:06

## 2025-06-27 RX ADMIN — SUCCINYLCHOLINE CHLORIDE 120 MG: 20 INJECTION, SOLUTION INTRAMUSCULAR; INTRAVENOUS at 07:06

## 2025-06-27 RX ADMIN — SUMATRIPTAN 6 MG: 6 INJECTION, SOLUTION SUBCUTANEOUS at 10:06

## 2025-06-27 RX ADMIN — ROCURONIUM BROMIDE 20 MG: 10 INJECTION, SOLUTION INTRAVENOUS at 08:06

## 2025-06-27 RX ADMIN — FENTANYL CITRATE 25 MCG: 50 INJECTION INTRAMUSCULAR; INTRAVENOUS at 12:06

## 2025-06-27 RX ADMIN — ESMOLOL HYDROCHLORIDE 30 MG: 100 INJECTION, SOLUTION INTRAVENOUS at 07:06

## 2025-06-27 RX ADMIN — PHENYLEPHRINE HYDROCHLORIDE 0.25 MCG/KG/MIN: 10 INJECTION INTRAVENOUS at 08:06

## 2025-06-27 RX ADMIN — HALOPERIDOL LACTATE 0.5 MG: 5 INJECTION, SOLUTION INTRAMUSCULAR at 10:06

## 2025-06-27 RX ADMIN — SODIUM CHLORIDE, POTASSIUM CHLORIDE, SODIUM LACTATE AND CALCIUM CHLORIDE 1000 ML: 600; 310; 30; 20 INJECTION, SOLUTION INTRAVENOUS at 09:06

## 2025-06-27 RX ADMIN — ACETAMINOPHEN 1000 MG: 10 INJECTION, SOLUTION INTRAVENOUS at 01:06

## 2025-06-27 RX ADMIN — DEXMEDETOMIDINE 8 MCG: 100 INJECTION, SOLUTION, CONCENTRATE INTRAVENOUS at 09:06

## 2025-06-27 RX ADMIN — METHOCARBAMOL 500 MG: 100 INJECTION INTRAMUSCULAR; INTRAVENOUS at 11:06

## 2025-06-27 RX ADMIN — ONDANSETRON 4 MG: 2 INJECTION INTRAMUSCULAR; INTRAVENOUS at 05:06

## 2025-06-27 RX ADMIN — PROPOFOL 170 MG: 10 INJECTION, EMULSION INTRAVENOUS at 07:06

## 2025-06-27 RX ADMIN — ROCURONIUM BROMIDE 20 MG: 10 INJECTION, SOLUTION INTRAVENOUS at 07:06

## 2025-06-27 RX ADMIN — ONDANSETRON 4 MG: 2 INJECTION INTRAMUSCULAR; INTRAVENOUS at 09:06

## 2025-06-27 RX ADMIN — SCOPOLAMINE 1 PATCH: 1.5 PATCH, EXTENDED RELEASE TRANSDERMAL at 10:06

## 2025-06-27 RX ADMIN — ROCURONIUM BROMIDE 10 MG: 10 INJECTION, SOLUTION INTRAVENOUS at 07:06

## 2025-06-27 RX ADMIN — ACETAMINOPHEN 1000 MG: 500 TABLET ORAL at 06:06

## 2025-06-27 RX ADMIN — SODIUM CHLORIDE, POTASSIUM CHLORIDE, SODIUM LACTATE AND CALCIUM CHLORIDE 500 ML: 600; 310; 30; 20 INJECTION, SOLUTION INTRAVENOUS at 06:06

## 2025-06-27 RX ADMIN — ROCURONIUM BROMIDE 10 MG: 10 INJECTION, SOLUTION INTRAVENOUS at 08:06

## 2025-06-27 RX ADMIN — SODIUM CHLORIDE: 0.9 INJECTION, SOLUTION INTRAVENOUS at 06:06

## 2025-06-27 NOTE — SUBJECTIVE & OBJECTIVE
Interval History: Tachy overnight to the 110s. Mildly responsive to fluids. EKG sinus tach. Pt reports being in pain overnight.     Medications:  Continuous Infusions:   lactated ringers   Intravenous Continuous 100 mL/hr at 06/27/25 1655 Rate Change at 06/27/25 1655     Scheduled Meds:   acetaminophen  1,000 mg Intravenous Q8H    enoxparin  40 mg Subcutaneous Q24H (prophylaxis, 1700)    ondansetron  4 mg Intravenous Q6H    pantoprazole  40 mg Intravenous Daily    potassium chloride  10 mEq Intravenous Q1H    scopolamine  1 patch Transdermal Q3 Days    sodium phosphate 15 mmol in D5W 250 mL IVPB  15 mmol Intravenous Once     PRN Meds:  Current Facility-Administered Medications:     HYDROmorphone, 0.5 mg, Intravenous, Q6H PRN    prochlorperazine, 5 mg, Intravenous, Q6H PRN     Review of patient's allergies indicates:   Allergen Reactions    Iodinated contrast media     Methocarbamol Itching    Percocet [oxycodone-acetaminophen] Nausea Only    Shellfish containing products Hives     Objective:     Vital Signs (Most Recent):  Temp: 99.4 °F (37.4 °C) (06/28/25 0734)  Pulse: 100 (06/28/25 0850)  Resp: 18 (06/28/25 0850)  BP: (!) 115/55 (06/28/25 0850)  SpO2: (!) 93 % (06/28/25 0850) Vital Signs (24h Range):  Temp:  [98.3 °F (36.8 °C)-99.7 °F (37.6 °C)] 99.4 °F (37.4 °C)  Pulse:  [] 100  Resp:  [14-20] 18  SpO2:  [93 %-100 %] 93 %  BP: (109-152)/(55-80) 115/55     Weight: 89.4 kg (197 lb 1.5 oz)  Body mass index is 37.24 kg/m².    Intake/Output - Last 3 Shifts         06/26 0700 06/27 0659 06/27 0700 06/28 0659 06/28 0700 06/29 0659    IV Piggyback  1000     Total Intake(mL/kg)  1000 (11.2)     Net  +1000            Unmeasured Urine Occurrence  6 x              Physical Exam  Vitals and nursing note reviewed.   Constitutional:       Appearance: Normal appearance. She is not ill-appearing.   HENT:      Head: Normocephalic.      Mouth/Throat:      Mouth: Mucous membranes are moist.      Pharynx: Oropharynx is  clear.   Eyes:      General: No scleral icterus.     Extraocular Movements: Extraocular movements intact.      Conjunctiva/sclera: Conjunctivae normal.   Cardiovascular:      Rate and Rhythm: Normal rate.      Pulses: Normal pulses.   Pulmonary:      Effort: Pulmonary effort is normal. No respiratory distress.      Breath sounds: No wheezing.   Abdominal:      General: Abdomen is flat. There is no distension.      Palpations: Abdomen is soft.      Comments: Incisions cdi w overlying dermabond   Musculoskeletal:         General: No swelling or tenderness. Normal range of motion.      Cervical back: Normal range of motion.   Skin:     General: Skin is warm and dry.      Coloration: Skin is not jaundiced or pale.   Neurological:      General: No focal deficit present.      Mental Status: She is alert and oriented to person, place, and time.   Psychiatric:         Mood and Affect: Mood normal.          Significant Labs:  I have reviewed all pertinent lab results within the past 24 hours.  CBC:   Recent Labs   Lab 06/28/25  0614   WBC 11.66   RBC 3.42*   HGB 10.7*   HCT 32.9*      MCV 96   MCH 31.3*   MCHC 32.5     CMP:   Recent Labs   Lab 06/28/25 0614   GLU 97   CALCIUM 8.4*      K 3.3*   CO2 23      BUN 7   CREATININE 0.6       Significant Diagnostics:  I have reviewed all pertinent imaging results/findings within the past 24 hours.

## 2025-06-27 NOTE — ANESTHESIA PROCEDURE NOTES
Bilateral DUNCAN SS    Patient location during procedure: pre-op   Block not for primary anesthetic.  Reason for block: at surgeon's request and post-op pain management   Post-op Pain Location: midline abdominal pain   Start time: 6/27/2025 6:40 AM  Timeout: 6/27/2025 6:40 AM   End time: 6/27/2025 6:45 AM    Staffing  Authorizing Provider: Jameel William MD  Performing Provider: Zulma Yoder MD    Staffing  Performed by: Zulma Yoder MD  Authorized by: Jameel William MD    Preanesthetic Checklist  Completed: patient identified, IV checked, site marked, risks and benefits discussed, surgical consent, monitors and equipment checked, pre-op evaluation and timeout performed  Peripheral Block  Patient position: sitting  Prep: ChloraPrep  Patient monitoring: heart rate, cardiac monitor, continuous pulse ox, continuous capnometry and frequent blood pressure checks  Block type: erector spinae plane  Laterality: bilateral  Injection technique: single shot  Interspace: T8-9    Needle  Needle type: Stimuplex   Needle gauge: 20 G  Needle length: 4 in  Needle localization: anatomical landmarks and ultrasound guidance   -ultrasound image captured on disc.  Assessment  Injection assessment: negative aspiration, negative parasthesia and local visualized surrounding nerve  Paresthesia pain: none  Heart rate change: no  Slow fractionated injection: yes  Pain Tolerance: comfortable throughout block and no complaints  Medications:    Medications: bupivacaine (pf) (MARCAINE) injection 0.75% - Perineural   30 mL - 6/27/2025 6:45:00 AM    Additional Notes  Patient tolerated well.  [facility]  1:300k epi added to local

## 2025-06-27 NOTE — DISCHARGE INSTRUCTIONS
WOUND CARE  You have skin glue over your incision(s); this will slowly flake away over the next few weeks.  -- Ok to shower; however, no baths or submerging in water (I.e. swimming, submerging in water) for at least two weeks.  -- Please keep the incision clean with soap and water, pat your incision dry, do not scrub hard over your incisions.    MEDICATIONS AND PAIN CONTROL  -- No swallowing whole pills until cleared by your surgeon. Crush all medications and mix in liquid. Open all capsules into liquid.  -- Most people find that over-the-counter pain medications (Tylenol) will be sufficient for most pain control.  -- If you're taking prescription narcotics, do not drive or operate heavy machinery. Do not drive if your pain is not controlled enough for you to react quickly safely.  -- Take a stool softener with narcotics medications to prevent constipation.    OTHER INSTRUCTIONS  -- Diet: One week of full liquid diet and the following week of softs as outlined in dietary consultation.  -- Monitor for temp > 101 F, bleeding, redness, purulent drainage, or any sudden, new extreme pain. If any occur, please call our clinic or go to the emergency department if after normal business hours.  -- No heavy lifting (anything >10 lbs or = to a gallon of milk) or strenuous exercise until cleared by a physician.  -- Follow up with Dr. Bustillos in 2 weeks in clinic for a post-op check. If no appointment is made within the week, please call the clinic to schedule.

## 2025-06-27 NOTE — TRANSFER OF CARE
"Anesthesia Transfer of Care Note    Patient: Karyn Bobby    Procedure(s) Performed: Procedure(s) (LRB):  XI ROBOTIC REPAIR, HIATAL HERNIA REDO, W/ FUNDOPLICATION (N/A)  EGD (ESOPHAGOGASTRODUODENOSCOPY) w/ endoflip (N/A)    Patient location: PACU    Anesthesia Type: general and regional    Transport from OR: Transported from OR on 6-10 L/min O2 by face mask with adequate spontaneous ventilation    Post pain: adequate analgesia    Post assessment: no apparent anesthetic complications and tolerated procedure well    Post vital signs: stable    Level of consciousness: responds to stimulation    Nausea/Vomiting: no nausea/vomiting    Complications: none    Transfer of care protocol was followed      Last vitals: Visit Vitals  BP (!) 100/54   Pulse 95   Temp 36.1 °C (97 °F) (Temporal)   Resp 18   Ht 5' 1" (1.549 m)   Wt 89.4 kg (197 lb)   LMP 06/02/2025   SpO2 99%   Breastfeeding No   BMI 37.22 kg/m²     "

## 2025-06-27 NOTE — NURSING TRANSFER
Nursing Transfer Note      6/27/2025   4:09 PM    Nurse giving handoff: ASIM Brar PACU  Nurse receiving handoff: MATY Rodriguez    Reason patient is being transferred: post anesthesia     Transfer To: 524    Transfer via bed    Transfer with 2L to O2    Transported by PCT    Order for Tele Monitor? No    Additional Lines: Oxygen    Any special needs or follow-up needed: routine care    Patient belongings transferred with patient: Yes    Chart send with patient: Yes    Notified: spouse

## 2025-06-27 NOTE — H&P
FOCUSED SURGICAL H&P    Karyn Bobby is a 44 y.o. female. MRN is [id number].    CC: Here today for the following surgical procedure(s):  XI ROBOTIC REPAIR, HIATAL HERNIA REDO, W/ FUNDOPLICATION w/ possible mesh (Abdomen)  EGD (ESOPHAGOGASTRODUODENOSCOPY) w/ endoflip (Abdomen)    HPI: For a detailed history of the patients history of present illness please refer to the last progress note. In brief, this is a 44 y.o. female with a known history of recurrent hiatal hernia, here today for surgical intervention. There has been no recent changes in the patients health, including fevers, chest pain, or shortness of breath, and no new medications have been started. The patient has not had anything to eat or drink for the last 8 hours. The patient has held all blood thinners.        Past Medical History:   Past Medical History:   Diagnosis Date    ACL (anterior cruciate ligament) rupture     RIGHT    Anxiety     Arthritis     Carpal tunnel syndrome     Contact lens/glasses fitting     Crohn's disease     Depression     Esophagitis     GERD (gastroesophageal reflux disease)     Joint pain     Migraine headache     Patella fracture     PVC (premature ventricular contraction)     STD (sexually transmitted disease) 2001    chlamydia       Past Surgical History:   Past Surgical History:   Procedure Laterality Date    ARTHROSCOPIC REPAIR OF ANTERIOR CRUCIATE LIGAMENT Right 5/29/2018    Procedure: REPAIR ANTERIOR CRUCIATE LIGAMENT-ARTHROSCOPICALLY AIDED;  Surgeon: Mal Vogel MD;  Location: [facility] OR;  Service: Orthopedics;  Laterality: Right;    COLONOSCOPY      20's    COLONOSCOPY N/A 3/23/2018    Procedure: COLONOSCOPY;  Surgeon: Brianne Willard MD;  Location: [facility];  Service: Endoscopy;  Laterality: N/A;    COLONOSCOPY N/A 11/20/2019    Procedure: COLONOSCOPY;  Surgeon: Benson Alvarado MD;  Location: [facility];  Service: Endoscopy;  Laterality: N/A;    COLONOSCOPY N/A 10/19/2021    Procedure: COLONOSCOPY;   Surgeon: Brianne Willard MD;  Location: [facility];  Service: Endoscopy;  Laterality: N/A;  Patient may be a few minutes late has to put daughter on bus    COLONOSCOPY N/A 6/24/2024    Procedure: COLONOSCOPY;  Surgeon: Brianne Willard MD;  Location: [facility];  Service: Endoscopy;  Laterality: N/A;    e sure  2012    ENDOMETRIAL ABLATION      ESOPHAGEAL MANOMETRY WITH MEASUREMENT OF IMPEDANCE N/A 5/1/2023    Procedure: MANOMETRY, ESOPHAGUS, WITH IMPEDANCE MEASUREMENT;  Surgeon: Lexi Lewis MD;  Location: [facility] (4TH FLR);  Service: Endoscopy;  Laterality: N/A;  instructions sent to [facility]    ESOPHAGOGASTRODUODENOSCOPY N/A 10/19/2021    Procedure: EGD (ESOPHAGOGASTRODUODENOSCOPY);  Surgeon: Brianne Willard MD;  Location: [facility];  Service: Endoscopy;  Laterality: N/A;    ESOPHAGOGASTRODUODENOSCOPY N/A 5/8/2023    Procedure: ESOPHAGOGASTRODUODENOSCOPY (EGD), WITH BRAVO;  Surgeon: Karen Lennon MD;  Location: [facility] (4TH FLR);  Service: Endoscopy;  Laterality: N/A;  instructions sent to [facility]  preop call no answer BP    ESOPHAGOGASTRODUODENOSCOPY N/A 2/26/2025    Procedure: EGD (ESOPHAGOGASTRODUODENOSCOPY);  Surgeon: Joe Michaud MD;  Location: [facility];  Service: General;  Laterality: N/A;    EXAMINATION UNDER ANESTHESIA N/A 12/14/2022    Procedure: EXAM UNDER ANESTHESIA;  Surgeon: Jordi Samaniego MD;  Location: [facility];  Service: OB/GYN;  Laterality: N/A;    HARDWARE REMOVAL Right 10/15/2020    Procedure: REMOVAL OF HARDWARE PATELLA - [place];  Surgeon: Troy Ferrell MD;  Location: [facility];  Service: Orthopedics;  Laterality: Right;    KNEE ARTHROSCOPY W/ MENISCECTOMY Right 5/29/2018    Procedure: ARTHROSCOPY-MENISCECTOMY;  Surgeon: Mal Vogel MD;  Location: [facility];  Service: Orthopedics;  Laterality: Right;    LAPAROSCOPIC CHOLECYSTECTOMY N/A 4/6/2022    Procedure: CHOLECYSTECTOMY, LAPAROSCOPIC;  Surgeon: Joe Michaud MD;  Location: [facility];   Service: General;  Laterality: N/A;    LAPAROSCOPIC DRAINAGE OF CYST OF OVARY Right 12/14/2022    Procedure: DRAINAGE, CYST, OVARY, LAPAROSCOPIC;  Surgeon: Jordi Samaniego MD;  Location: [facility];  Service: OB/GYN;  Laterality: Right;    LAPAROSCOPIC TOUPET FUNDOPLICATION N/A 6/27/2023    Procedure: FUNDOPLICATION, LAPAROSCOPIC, TOUPET;  Surgeon: Thomas Díaz MD;  Location: [facility];  Service: General;  Laterality: N/A;    LIPOMA RESECTION      OPEN REDUCTION AND INTERNAL FIXATION (ORIF) OF FRACTURE OF PATELLA Right 7/10/2020    Procedure: ORIF, FRACTURE, PATELLA;  Surgeon: Troy Ferrell MD;  Location: [facility];  Service: Orthopedics;  Laterality: Right;    PH MONITORING, ESOPHAGUS, WIRELESS, (OFF REFLUX MEDS) N/A 5/8/2023    Procedure: PH MONITORING, ESOPHAGUS, WIRELESS, (OFF REFLUX MEDS);  Surgeon: Karen Lennon MD;  Location: [address]);  Service: Endoscopy;  Laterality: N/A;  48 Hour  Off PPI/H2 Blocker  No allergy or sensitivity to metal/jewelry per pt-KPvt    REPAIR, HERNIA, HIATAL, LAPAROSCOPIC N/A 6/27/2023    Procedure: REPAIR, HERNIA, HIATAL, LAPAROSCOPIC;  Surgeon: Thomas Díaz MD;  Location: [facility];  Service: General;  Laterality: N/A;       Social History: Social History[1]    Family History:   Family History   Adopted: Yes   Problem Relation Name Age of Onset    No Known Problems Mother      No Known Problems Father            Allergies:  Review of patient's allergies indicates:   Allergen Reactions    Iodinated contrast media     Methocarbamol Itching    Percocet [oxycodone-acetaminophen] Nausea Only    Shellfish containing products Hives         Medications:  Current Medications[2]                  Vital Signs:  Vitals:    06/27/25 0600   BP:    Pulse: 93   Resp:    Temp:          Physical Exam:  Neuro: awake, alert, no acute distress.  HEENT: PERRLA, neck supple, no lymphadenopathy.  Heart: regular rate/rhythm  Lungs: equal chest expansion bilaterally, no increased work of breathing on  "RA  Abdomen: soft, non-distended, non-tender to palpation. Well healed laparoscopic incisions.  Extremities: warm, well-perfused       Labs:  Lab Results   Component Value Date/Time    WBC 8.92 06/09/2025 11:49 AM    HGB 13.8 06/09/2025 11:49 AM    HGB 12.3 05/06/2024 12:28 PM    HCT 43.6 06/09/2025 11:49 AM    HCT 39.7 05/06/2024 12:28 PM     06/09/2025 11:49 AM     05/06/2024 12:28 PM     (H) 06/09/2025 11:49 AM     (H) 05/06/2024 12:28 PM     Lab Results   Component Value Date/Time     06/09/2025 11:49 AM     05/16/2024 10:17 AM    K 3.8 06/09/2025 11:49 AM    K 4.0 05/16/2024 10:17 AM     06/09/2025 11:49 AM     05/16/2024 10:17 AM    CO2 28 06/09/2025 11:49 AM    CO2 27 05/16/2024 10:17 AM    BUN 23 (H) 06/09/2025 11:49 AM     (H) 06/09/2025 11:49 AM     (H) 05/16/2024 10:17 AM    MG 2.4 07/25/2023 03:39 PM    PHOS 2.7 06/28/2023 07:59 AM     No results found for: "INR", "PT", "PTT"  No components found for: "TROPI"  Lab Results   Component Value Date/Time    ALT 12 03/22/2025 07:30 PM    ALT 13 05/16/2024 10:17 AM    AST 17 03/22/2025 07:30 PM    AST 14 05/16/2024 10:17 AM    LIPASE 36 07/25/2023 03:39 PM          Assessment/Plan:  44 y.o. female here today for the following surgical procedure:    XI ROBOTIC REPAIR, HIATAL HERNIA REDO, W/ FUNDOPLICATION w/ possible mesh (Abdomen)  EGD (ESOPHAGOGASTRODUODENOSCOPY) w/ endoflip (Abdomen)       The indications for surgery, highlighting the risks and benefits of the procedure were discussed with the patient. These included but are not limited to swelling, bleeding, pain, infection, and adverse anesthesia-related event. I also discussed risk of injury to nearby structures. The patient seems to understand the risks, as well as the alternatives including nonoperative observation/survellience and wishes to proceed with the surgical intervention.    Patient has been examined, and consented.      Plan " discussed with and agreed by Dr. Bustillos.      Leeanna Granados MD  General Surgery, PGY-1         [1]   Social History  Socioeconomic History    Marital status:     Years of education: 12+   Tobacco Use    Smoking status: Former     Current packs/day: 0.00     Types: Cigarettes     Start date: 3/23/2000     Quit date: 3/23/2001     Years since quittin.2    Smokeless tobacco: Never   Substance and Sexual Activity    Alcohol use: Not Currently    Drug use: No    Sexual activity: Yes     Partners: Male     Birth control/protection: Surgical, None     Comment: Essure     Social Drivers of Health     Financial Resource Strain: Medium Risk (2025)    Overall Financial Resource Strain (CARDIA)     Difficulty of Paying Living Expenses: Somewhat hard   Food Insecurity: Food Insecurity Present (2025)    Hunger Vital Sign     Worried About Running Out of Food in the Last Year: Sometimes true     Ran Out of Food in the Last Year: Sometimes true   Transportation Needs: Unmet Transportation Needs (2025)    PRAPARE - Transportation     Lack of Transportation (Medical): Yes     Lack of Transportation (Non-Medical): Yes   Physical Activity: Sufficiently Active (2025)    Exercise Vital Sign     Days of Exercise per Week: 5 days     Minutes of Exercise per Session: 120 min   Stress: Stress Concern Present (2025)    [employer] [place] [employer] - Occupational Stress Questionnaire     Feeling of Stress : Very much   Housing Stability: High Risk (2025)    Housing Stability Vital Sign     Unable to Pay for Housing in the Last Year: Yes     Homeless in the Last Year: No   [2]   Current Facility-Administered Medications:     0.9% NaCl infusion, , Intravenous, Continuous, Leeanna Granados MD    acetaminophen tablet 1,000 mg, 1,000 mg, Oral, Once Pre-Op, Jaylene Granados MD    ceFAZolin 2 g, 2 g, Intravenous, On Call Procedure, Leeanna Granados MD    midazolam injection 0.5-4 mg, 0.5-4  mg, Intravenous, PRN, Horacio Orlando, DO

## 2025-06-27 NOTE — HOSPITAL COURSE
Sp robo redo hiatal hernia repair w endoflip on 6/27  Uncomplicated surgery  Hgb stable  Tolerating appropriate diet, able to stay hydrated, pain controlled on oral pain meds, ambulating at baseline  Fu in 2 weeks in clinic  Meets all criteria for discharge

## 2025-06-27 NOTE — BRIEF OP NOTE
[facility] - Surgery ([facility])  Brief Operative Note    SUMMARY     Surgery Date: 6/27/2025     Surgeons and Role:     * Evan Bustillos MD - Primary     * Peyton Montenegro MD - Resident - Assisting        Pre-op Diagnosis:  Hiatal hernia with GERD [K44.9, K21.9]    Post-op Diagnosis:  Post-Op Diagnosis Codes:     * Hiatal hernia with GERD [K44.9, K21.9]    Procedure(s) (LRB):  XI ROBOTIC REPAIR, HIATAL HERNIA REDO, W/ FUNDOPLICATION (N/A)  EGD (ESOPHAGOGASTRODUODENOSCOPY) w/ endoflip (N/A)    Anesthesia: General/Regional    Implants:  Implant Name Type Inv. Item Serial No.  Lot No. LRB No. Used Action   FELT KARLA [id number] - [id number]  FELT KARLA [id number]  [employer] [id number] N/A 1 Implanted       Operative Findings: Robo hiatal hernia repair with egd + endoflip without apparent complication    Estimated Blood Loss: less than 10cc         Specimens:   Specimen (24h ago, onward)       Start     Ordered    06/27/25 0928  Specimen to Pathology General Surgery  RELEASE UPON ORDERING        References:    Click here for ordering Quick Tip   Question:  Release to patient  Answer:  Immediate    06/27/25 0928                  ID Type Source Tests Collected by Time Destination   1 : 1) Hernia Sac Tissue Hernia sac SPECIMEN TO PATHOLOGY Evan Bustillos MD 6/27/2025 0928        [id number]

## 2025-06-27 NOTE — ANESTHESIA PROCEDURE NOTES
Intubation    Date/Time: 6/27/2025 7:10 AM    Performed by: Jaylene Granados MD  Authorized by: Christiano Payton MD    Intubation:     Induction:  Intravenous    Intubated:  Postinduction    Mask Ventilation:  Not attempted    Attempts:  1    Attempted By:  Resident anesthesiologist    Method of Intubation:  Video laryngoscopy    Blade:  Mason 3    Laryngeal View Grade: Grade I - full view of cords      Difficult Airway Encountered?: No      Complications:  None    Airway Device:  Oral endotracheal tube    Airway Device Size:  7.0    Style/Cuff Inflation:  Cuffed    Tube secured:  22    Secured at:  The lips    Placement Verified By:  Capnometry    Complicating Factors:  None    Findings Post-Intubation:  BS equal bilateral

## 2025-06-27 NOTE — BRIEF OP NOTE
Operative Note       Surgery Date: 6/27/2025     Surgeons and Role:     * Evan Bustillos MD - Primary     * Peyton Montenegro MD - Resident - Assisting    Pre-op Diagnosis:  Hiatal hernia with GERD [K44.9, K21.9]    Post-op Diagnosis:  Hiatal hernia with GERD [K44.9, K21.9]    Procedure(s) (LRB):  XI ROBOTIC REPAIR, HIATAL HERNIA REDO, W/ FUNDOPLICATION (N/A)  EGD (ESOPHAGOGASTRODUODENOSCOPY) w/ endoflip (N/A)  Suggest 22 modifier    Anesthesia: General/Regional    Procedure in Detail/Findings:  25 minutes to dissect out from prior hernia repair.  Egd showed no leaks.  Endoflip ok.   HH with toupet.    Estimated Blood Loss: Minimal           Specimens (From admission, onward)       Start     Ordered    06/27/25 0928  Specimen to Pathology General Surgery  RELEASE UPON ORDERING        References:    Click here for ordering Quick Tip   Question:  Release to patient  Answer:  Immediate    06/27/25 0928                  Implants:   Implant Name Type Inv. Item Serial No.  Lot No. LRB No. Used Action   FELT KARLA [id number] - [id number]  FELT KARLA [id number]  [employer] [place] [id number] N/A 1 Implanted              Disposition: PACU - hemodynamically stable.           Condition: Good    Attestation:  I was present and scrubbed for the entire procedure.

## 2025-06-27 NOTE — ASSESSMENT & PLAN NOTE
Karyn Bobby is an 44 y.o. female that is s/p robo redo hiatal hernia repair + endoflip on 6/27.  Doing fine this morning. Tachycardia stable to preop and improving with pain meds.       - Diet: advance to clears  - Pain: multimodal, minimize narcotics  - OK for pills < pencil eraser, liquids, or crushed  - Nausea control w/ scheduled and PRN antiemetics   - Home meds as appropriate  - Replete lytes PRN  - Daily labs- repeat cbc at noon  - OOB to chair and ambulate in halls   - Encourage IS  - DVT ppx     Dispo: Will plan for possible discharge this afternoon if tolerating diet, able to remain well hydrated. Post-op instructions given and discussed with patient this morning.

## 2025-06-27 NOTE — OP NOTE
DATE OF PROCEDURE: 6/27/2025    PRE OP DIAGNOSIS: Hiatal hernia with GERD [K44.9, K21.9]    POST OP DIAGNOSIS: Hiatal hernia with GERD [K44.9, K21.9]    PROCEDURE: Procedure(s) (LRB):  XI ROBOTIC REPAIR, HIATAL HERNIA REDO, W/ FUNDOPLICATION (N/A)  EGD (ESOPHAGOGASTRODUODENOSCOPY) w/ endoflip (N/A)    Hernia type 3  Without mesh  With 22 modifier    Surgeons and Role:     * Evan Bustillos MD - Primary     * Peyton Montenegro MD - Resident - Assisting    ANESTHESIA: General.     Procedure:    Patient was placed under general anesthesia and the abdomen was prepped and draped in usual manner.  We marked out our port sites starting at 15 cm below xiphoid and just to the left of midline and then at 7 cm intervals to to the left and 2 to the right.  We accessed the peritoneum with a 5 mm Optiview trocar under direct vision through the right most port site and pneumoperitoneum to 15 mmHg CO2 gas was obtained.  Under direct vision the rest of the robotic trocars were placed.  The patient was placed in steep reverse Trendelenburg and the liver retractor was placed exposing the esophageal hiatus.  Using the synchro seal lesser sac was incised exposing the left luis and the peritoneum was divided at the left luis.  We bluntly dissected the left luis and the around posteriorly and anteriorly dividing the peritoneum with a synchro seal.  We continued similarly onto the left side and bluntly got around the esophagus.  We then took down the greater curve starting approximately a 3rd of the way down the stomach and going all the way to the base of the left luis and taking all posterior attachments.  We then dissected deep into the mediastinum until 3-4 cm soft gas way within the abdominal cavity without tension.  This took 25 minutes more than a typical procedure due to adhesions from prior hh repair.  These were taken down with the syncrocele and sharply.  There was a small hiatal hernia.  There was no significant  posterior fat pad.  There was a significant hernia sac.The hernia sac was divided from the LES starting to the left of the anterior vagas nerve to the left of the posterior vagas nerve.  At the end of the case it was removed from the abdomen.    Endoscopy was performed.  The endoscope easily traversed oropharynx, esophagus, stomach and entered the duodenum.  No leaks were seen.  We measured the Z-line twice from the incisors at 34 and 34 cm.  We then aspirated all of the air from the stomach and removed the endoscope and placed the endo flip.  We placed the endo flip centered at the Z line and dilated it to 30 cc for 30 seconds and then 40 cc for 30 seconds.  Minimal diameter was 15.8 and distensibility 5.4.  The endo flip was desufflated and removed.  We felt with these numbers it was fine to progress with the case.      We cut 2 pledgets to 1 x 1.5 cm and placed them on the lateral sides of each crura and then sewed the hiatus closed with a 0 permanent V lock suture posterior to the esophagus and through the pledgets.  At the end of this we checked for appropriate size of the esophagus and hiatus with the robotic instruments.  We then took the fundus of the stomach and pulled around posterior to the esophagus checked for the angle of Hiss and performed a shoe shine maneuver.  We placed the 58 [place] bougie through the mouth esophagus and into the stomach and it easily passed.  We again checked the hiatus for size and then performed a Toupet fundoplication by taking the appropriate part of the fundus and sewing it to just the right of the anterior vagus nerve with a running 2-0 permanent V lock suture for a length of 2 cm.  We selected the appropriate piece of fundus on the other side of the esophagus and pulled up along the esophagus up to the vagus nerve and checked for appropriate tension on the stomach and sewed it in place with again with a running 2 0 V lock suture for a length of 2 cm.  The vagus nerves were  both left intact along the esophagus.  We removed the dilator and again inspected our wrap and hiatus and found them to be of appropriate size and configuration.  The liver was tractor was removed and the trocars removed under direct vision.  Prior to removing the last trocar pneumoperitoneum was allowed to escape.  The skin incisions were repaired with 4-0 Plain Cat Gut and reinforced with Dermabond.  The patient tolerated procedure well was brought her room stable condition.  Sponge and needle counts were correct at the end of the case.  Blood loss is minimal, complications none and pathology hernia sac.

## 2025-06-28 LAB
ABSOLUTE EOSINOPHIL (OHS): 0.04 K/UL
ABSOLUTE MONOCYTE (OHS): 0.87 K/UL (ref 0.3–1)
ABSOLUTE NEUTROPHIL COUNT (OHS): 8.36 K/UL (ref 1.8–7.7)
ANION GAP (OHS): 8 MMOL/L (ref 8–16)
BASOPHILS # BLD AUTO: 0.04 K/UL
BASOPHILS NFR BLD AUTO: 0.4 %
BUN SERPL-MCNC: 7 MG/DL (ref 6–20)
CALCIUM SERPL-MCNC: 8.4 MG/DL (ref 8.7–10.5)
CHLORIDE SERPL-SCNC: 108 MMOL/L (ref 95–110)
CO2 SERPL-SCNC: 23 MMOL/L (ref 23–29)
CREAT SERPL-MCNC: 0.6 MG/DL (ref 0.5–1.4)
ERYTHROCYTE [DISTWIDTH] IN BLOOD BY AUTOMATED COUNT: 13.9 % (ref 11.5–14.5)
ERYTHROCYTE [DISTWIDTH] IN BLOOD BY AUTOMATED COUNT: 14 % (ref 11.5–14.5)
GFR SERPLBLD CREATININE-BSD FMLA CKD-EPI: >60 ML/MIN/1.73/M2
GLUCOSE SERPL-MCNC: 97 MG/DL (ref 70–110)
HCT VFR BLD AUTO: 32.9 % (ref 37–48.5)
HCT VFR BLD AUTO: 33.2 % (ref 37–48.5)
HGB BLD-MCNC: 10.7 GM/DL (ref 12–16)
HGB BLD-MCNC: 10.7 GM/DL (ref 12–16)
IMM GRANULOCYTES # BLD AUTO: 0.04 K/UL (ref 0–0.04)
IMM GRANULOCYTES NFR BLD AUTO: 0.4 % (ref 0–0.5)
LYMPHOCYTES # BLD AUTO: 1.85 K/UL (ref 1–4.8)
MAGNESIUM SERPL-MCNC: 1.7 MG/DL (ref 1.6–2.6)
MCH RBC QN AUTO: 30.8 PG (ref 27–31)
MCH RBC QN AUTO: 31.3 PG (ref 27–31)
MCHC RBC AUTO-ENTMCNC: 32.2 G/DL (ref 32–36)
MCHC RBC AUTO-ENTMCNC: 32.5 G/DL (ref 32–36)
MCV RBC AUTO: 96 FL (ref 82–98)
MCV RBC AUTO: 96 FL (ref 82–98)
NUCLEATED RBC (/100WBC) (OHS): 0 /100 WBC
OHS QRS DURATION: 82 MS
OHS QTC CALCULATION: 477 MS
PHOSPHATE SERPL-MCNC: 2.4 MG/DL (ref 2.7–4.5)
PLATELET # BLD AUTO: 275 K/UL (ref 150–450)
PLATELET # BLD AUTO: 295 K/UL (ref 150–450)
PMV BLD AUTO: 9.2 FL (ref 9.2–12.9)
PMV BLD AUTO: 9.5 FL (ref 9.2–12.9)
POTASSIUM SERPL-SCNC: 3.3 MMOL/L (ref 3.5–5.1)
RBC # BLD AUTO: 3.42 M/UL (ref 4–5.4)
RBC # BLD AUTO: 3.47 M/UL (ref 4–5.4)
RELATIVE EOSINOPHIL (OHS): 0.4 %
RELATIVE LYMPHOCYTE (OHS): 16.5 % (ref 18–48)
RELATIVE MONOCYTE (OHS): 7.8 % (ref 4–15)
RELATIVE NEUTROPHIL (OHS): 74.5 % (ref 38–73)
SODIUM SERPL-SCNC: 139 MMOL/L (ref 136–145)
WBC # BLD AUTO: 11.2 K/UL (ref 3.9–12.7)
WBC # BLD AUTO: 11.66 K/UL (ref 3.9–12.7)

## 2025-06-28 PROCEDURE — 84100 ASSAY OF PHOSPHORUS: CPT

## 2025-06-28 PROCEDURE — 25000003 PHARM REV CODE 250

## 2025-06-28 PROCEDURE — 63600175 PHARM REV CODE 636 W HCPCS

## 2025-06-28 PROCEDURE — 25000242 PHARM REV CODE 250 ALT 637 W/ HCPCS

## 2025-06-28 PROCEDURE — 36415 COLL VENOUS BLD VENIPUNCTURE: CPT

## 2025-06-28 PROCEDURE — 85027 COMPLETE CBC AUTOMATED: CPT

## 2025-06-28 PROCEDURE — 85025 COMPLETE CBC W/AUTO DIFF WBC: CPT

## 2025-06-28 PROCEDURE — 63600175 PHARM REV CODE 636 W HCPCS: Performed by: COMMUNITY HEALTH WORKER

## 2025-06-28 PROCEDURE — 83735 ASSAY OF MAGNESIUM: CPT

## 2025-06-28 PROCEDURE — 80048 BASIC METABOLIC PNL TOTAL CA: CPT

## 2025-06-28 RX ORDER — ONDANSETRON 4 MG/1
8 TABLET, ORALLY DISINTEGRATING ORAL EVERY 6 HOURS PRN
Qty: 120 TABLET | Refills: 0 | Status: SHIPPED | OUTPATIENT
Start: 2025-06-28 | End: 2025-07-28

## 2025-06-28 RX ORDER — OXYCODONE HCL 5 MG/5 ML
5 SOLUTION, ORAL ORAL EVERY 4 HOURS PRN
Refills: 0 | Status: DISCONTINUED | OUTPATIENT
Start: 2025-06-28 | End: 2025-06-28 | Stop reason: HOSPADM

## 2025-06-28 RX ORDER — POTASSIUM CHLORIDE 7.45 MG/ML
10 INJECTION INTRAVENOUS
Status: COMPLETED | OUTPATIENT
Start: 2025-06-28 | End: 2025-06-28

## 2025-06-28 RX ORDER — SCOPOLAMINE 1 MG/3D
1 PATCH, EXTENDED RELEASE TRANSDERMAL
Qty: 2 PATCH | Refills: 0 | Status: SHIPPED | OUTPATIENT
Start: 2025-06-30

## 2025-06-28 RX ORDER — OXYCODONE HCL 5 MG/5 ML
5 SOLUTION, ORAL ORAL EVERY 4 HOURS PRN
Qty: 25 ML | Refills: 0 | Status: SHIPPED | OUTPATIENT
Start: 2025-06-28

## 2025-06-28 RX ADMIN — PANTOPRAZOLE SODIUM 40 MG: 40 INJECTION, POWDER, FOR SOLUTION INTRAVENOUS at 08:06

## 2025-06-28 RX ADMIN — POTASSIUM CHLORIDE 10 MEQ: 7.46 INJECTION, SOLUTION INTRAVENOUS at 12:06

## 2025-06-28 RX ADMIN — HYDROMORPHONE HYDROCHLORIDE 0.5 MG: 1 INJECTION, SOLUTION INTRAMUSCULAR; INTRAVENOUS; SUBCUTANEOUS at 08:06

## 2025-06-28 RX ADMIN — SODIUM PHOSPHATE, MONOBASIC, MONOHYDRATE AND SODIUM PHOSPHATE, DIBASIC, ANHYDROUS 15 MMOL: 142; 276 INJECTION, SOLUTION INTRAVENOUS at 08:06

## 2025-06-28 RX ADMIN — POTASSIUM CHLORIDE 10 MEQ: 7.46 INJECTION, SOLUTION INTRAVENOUS at 09:06

## 2025-06-28 RX ADMIN — POTASSIUM CHLORIDE 10 MEQ: 7.46 INJECTION, SOLUTION INTRAVENOUS at 11:06

## 2025-06-28 RX ADMIN — ONDANSETRON 4 MG: 2 INJECTION INTRAMUSCULAR; INTRAVENOUS at 11:06

## 2025-06-28 RX ADMIN — ONDANSETRON 4 MG: 2 INJECTION INTRAMUSCULAR; INTRAVENOUS at 05:06

## 2025-06-28 RX ADMIN — OXYCODONE HYDROCHLORIDE 5 MG: 5 SOLUTION ORAL at 11:06

## 2025-06-28 RX ADMIN — ACETAMINOPHEN 1000 MG: 10 INJECTION, SOLUTION INTRAVENOUS at 05:06

## 2025-06-28 RX ADMIN — POTASSIUM CHLORIDE 10 MEQ: 7.46 INJECTION, SOLUTION INTRAVENOUS at 08:06

## 2025-06-28 RX ADMIN — METHOCARBAMOL 500 MG: 100 INJECTION INTRAMUSCULAR; INTRAVENOUS at 05:06

## 2025-06-28 RX ADMIN — POTASSIUM CHLORIDE 10 MEQ: 7.46 INJECTION, SOLUTION INTRAVENOUS at 10:06

## 2025-06-28 RX ADMIN — POTASSIUM CHLORIDE 10 MEQ: 7.46 INJECTION, SOLUTION INTRAVENOUS at 07:06

## 2025-06-28 NOTE — CONSULTS
Food & Nutrition Education     Diet Education: Post-op modified post GI surgery diet   Time Spent: 10 minutes   Learners: patient      Nutrition Education provided with handouts: Diet After Nissen Fundoplication      Comments: Pt agreed to review education materials - encouraged full liquid diet x 1 week followed by post-nissen diet x 1 week. Discussed ways to prevent stomach stretching and excess gas to avoid possible complications. All questions and concerns answered. Dietitian's contact information provided.          Follow-Up: yes   Please Re-consult as needed.      Thanks!

## 2025-06-28 NOTE — DISCHARGE SUMMARY
[facility]  General Surgery  Discharge Summary      Patient Name: Karyn Bobby  MRN: [id number]  Admission Date: 6/27/2025  Hospital Length of Stay: 0 days  Discharge Date and Time: No discharge date for patient encounter.  Attending Physician: Evan Bustillos, *   Discharging Provider: Peyton Montenegro MD  Primary Care Provider: Connor Clements II, NP    HPI:   43y/o with bmi 37, depression, diarrhea, muscle weakness, impaired motility, crohn's disease on medications, s/p lap hh with toupet 2023 (posterior closure with suture) with recurrence.  S/p lap ovarian surgery, s/p lap krystle.  She did well initially with her hh repair but developed nausea and vomiting and also has heartburn.  She also has dysphagia.  She has symptoms despite zofran 1-2 times a day.  She is not on ppi as it doesn't help.     She is a stay at home mom.     Cbc, cmp reviewed, ok  Ugi reviewed, films viewed, moderate hh with regurgitation and dysphagia.  Other findings in report.  Suggest modified bas.  Egd reviewed, gastritis, moderate hh  Preop hh repair  motility and ph showed, poor motility and positive reflux  EKG 2023 reviewed    -Nonspecific T wave abnormality     -Prolonged QT        Procedure(s) (LRB):  XI ROBOTIC REPAIR, HIATAL HERNIA REDO, W/ FUNDOPLICATION (N/A)  EGD (ESOPHAGOGASTRODUODENOSCOPY) w/ endoflip (N/A)      Indwelling Lines/Drains at time of discharge:   Lines/Drains/Airways       None                 Hospital Course: Sp robo redo hiatal hernia repair w endoflip on 6/27  Uncomplicated surgery  Hgb stable- drop was likely dilutional  Tolerating appropriate diet, able to stay hydrated, pain controlled on oral pain meds, ambulating at baseline  Fu in 2 weeks in clinic  Meets all criteria for discharge      Goals of Care Treatment Preferences:  Code Status: Full Code      Consults:   Consults (From admission, onward)          Status Ordering Provider     Inpatient consult to Registered Dietitian/Nutritionist   Once        Provider:  (Not yet assigned)    Completed OTONIEL ROLAND            Significant Diagnostic Studies: Labs: CMP   Recent Labs   Lab 06/28/25  0614      K 3.3*      CO2 23   GLU 97   BUN 7   CREATININE 0.6   CALCIUM 8.4*   ANIONGAP 8    and CBC   Recent Labs   Lab 06/28/25  0614 06/28/25  1139   WBC 11.66 11.20   HGB 10.7* 10.7*   HCT 32.9* 33.2*    275       Pending Diagnostic Studies:       None          Final Active Diagnoses:    Diagnosis Date Noted POA    PRINCIPAL PROBLEM:  History of repair of hiatal hernia [Z98.890, Z87.19] 12/14/2022 Not Applicable      Problems Resolved During this Admission:      Discharged Condition: fair    Disposition: Home or Self Care    Follow Up:   Follow-up Information       Evan Bustillos MD Follow up in 2 week(s).    Specialties: General Surgery, Bariatrics  Contact information:  [address]  [facility] [address]  [phone]                           Patient Instructions:      Lifting restrictions   Order Comments: No lifting greater than 10 pounds for 6 weeks from day of surgery.  No pushing/pulling such as vacuuming or raking.  No straining, avoid constipation and take stool softeners as described and laxatives as needed.  No driving while on narcotics and until you can react quickly without pain.     Notify your health care provider if you experience any of the following:  temperature >100.4     Notify your health care provider if you experience any of the following:  severe uncontrolled pain     Notify your health care provider if you experience any of the following:  redness, tenderness, or signs of infection (pain, swelling, redness, odor or green/yellow discharge around incision site)     Medications:  Reconciled Home Medications:     ** please take crushed meds, liquid meds, or open capsules into water/applesauce until clinic follow up**        Medication List        START taking these medications      scopolamine 1.3-1.5 mg  (1 mg over 3 days)  Commonly known as: TRANSDERM-SCOP  Place 1 patch onto the skin Every 3 (three) days.  Start taking on: June 30, 2025            CHANGE how you take these medications      * ondansetron 8 MG Tbdl  Commonly known as: ZOFRAN-ODT  Take 1 tablet (8 mg total) by mouth every 8 (eight) hours as needed (Nausea/vomiting).  What changed: Another medication with the same name was added. Make sure you understand how and when to take each.     * ondansetron 4 MG Tbdl  Commonly known as: ZOFRAN-ODT  Take 2 tablets (8 mg total) by mouth every 6 (six) hours as needed.  What changed: You were already taking a medication with the same name, and this prescription was added. Make sure you understand how and when to take each.           * This list has 2 medication(s) that are the same as other medications prescribed for you. Read the directions carefully, and ask your doctor or other care provider to review them with you.                CONTINUE taking these medications      aspirin-acetaminophen-caffeine 250-250-65 mg 250-250-65 mg per tablet  Commonly known as: EXCEDRIN MIGRAINE  Take 1 tablet by mouth every 6 (six) hours as needed for Pain.     azaTHIOprine 50 mg Tab  Commonly known as: IMURAN  Take 2 tablets (100 mg total) by mouth once daily.     busPIRone 5 MG Tab  Commonly known as: BUSPAR  Take 1 tablet (5 mg total) by mouth 2 (two) times daily.     citalopram 40 MG tablet  Commonly known as: CeleXA  TAKE 1 TABLET (40 MG TOTAL) BY MOUTH ONCE DAILY.     EPINEPHrine 0.3 mg/0.3 mL Atin  Commonly known as: EPIPEN  Inject 0.3 mLs (0.3 mg total) into the muscle once. For signs of anaphylaxis emergency for 1 dose     gabapentin 100 MG capsule  Commonly known as: NEURONTIN  Take 100 mg by mouth 3 (three) times daily.     HUMIRA PEN Pnkt injection  Generic drug: adalimumab  INJECT CONTENTS OF 1 PEN (40MG) EVERY 14 DAYS AS DIRECTED     ibuprofen 600 MG tablet  Commonly known as: ADVIL,MOTRIN  Take 1 tablet (600 mg  total) by mouth every 6 (six) hours as needed for Pain.     VENTOLIN HFA 90 mcg/actuation inhaler  Generic drug: albuterol  INHALE 1-2 PUFFS INTO THE LUNGS EVERY 6 (SIX) HOURS AS NEEDED FOR SHORTNESS OF BREATH. RESCUE            Time spent on the discharge of patient: 15 minutes    Peyton Montenegro MD  General Surgery  [facility] - Surgery

## 2025-06-28 NOTE — PLAN OF CARE
[facility]  Initial Discharge Assessment       Primary Care Provider: Connor Clements II, NP    Admission Diagnosis: Hiatal hernia with GERD [K44.9, K21.9]  Hiatal hernia [K44.9]    Admission Date: 6/27/2025  Expected Discharge Date:     Transition of Care Barriers: None    Payor: MEDICARE / Plan: MEDICARE PART A & B / Product Type: Government /     Extended Emergency Contact Information  Primary Emergency Contact: Eugenio Shipley  Address: [address] [place] [address] [facility]  Home Phone: [phone]  Work Phone: [phone]  Mobile Phone: [phone]  Relation: Spouse   needed? No  Secondary Emergency Contact: SHREYAS LUQUE   [facility]  Home Phone: [phone]  Work Phone: [phone]  Mobile Phone: [phone]  Relation: Relative   needed? No    Discharge Plan A: Home with family  Discharge Plan B: Home      [facility]  [address]  [place] [address]  Phone: [phone] Fax: [phone]    [facility]  [address] [facility] [place] [address]  Phone: [phone] Fax: [phone]    [id number] - [place], [place] - [address]  [address]  [place] [address]  Phone: [phone] Fax: [phone]      Initial Assessment (most recent)       Adult Discharge Assessment - 06/28/25 1114          Discharge Assessment    Assessment Type Discharge Planning Assessment     Confirmed/corrected address, phone number and insurance Yes     Confirmed Demographics Correct on Facesheet     Source of Information patient     Communicated BRIDGETT with patient/caregiver Yes     People in Home spouse     Name(s) of People in Home Lamin Shipleyry (Spouse)  [phone]     Do you expect to return to your current living situation? Yes     Prior to hospitilization cognitive status: Alert/Oriented     Current cognitive status:  Alert/Oriented     Dressing/Bathing Difficulty no     Equipment Currently Used at Home other (see comments)     Readmission within 30 days? No     Patient currently being followed by outpatient case management? No     Do you currently have service(s) that help you manage your care at home? No     Do you take prescription medications? Yes     Do you have prescription coverage? Yes     Do you have any problems affording any of your prescribed medications? No     Is the patient taking medications as prescribed? yes     Who is going to help you get home at discharge? Eugenio Shipley (Spouse)  [phone]     How do you get to doctors appointments? family or friend will provide     Are you on dialysis? No     Do you take coumadin? No     Discharge Plan A Home with family     Discharge Plan B Home     DME Needed Upon Discharge  other (see comments)     Discharge Plan discussed with: Patient     Transition of Care Barriers None        Physical Activity    On average, how many days per week do you engage in moderate to strenuous exercise (like a brisk walk)? Patient declined     On average, how many minutes do you engage in exercise at this level? Patient declined        Financial Resource Strain    How hard is it for you to pay for the very basics like food, housing, medical care, and heating? Not very hard        Housing Stability    In the last 12 months, was there a time when you were not able to pay the mortgage or rent on time? No     At any time in the past 12 months, were you homeless or living in a shelter (including now)? No        Transportation Needs    In the past 12 months, has lack of transportation kept you from medical appointments or from getting medications? No     In the past 12 months, has lack of transportation kept you from meetings, work, or from getting things needed for daily living? No        Food Insecurity    Within the past 12 months, you worried that your food would run out before you got the  money to buy more. Never true     Within the past 12 months, the food you bought just didn't last and you didn't have money to get more. Never true        Stress    Do you feel stress - tense, restless, nervous, or anxious, or unable to sleep at night because your mind is troubled all the time - these days? Not at all        Social Isolation    How often do you feel lonely or isolated from those around you?  Never        Alcohol Use    Q1: How often do you have a drink containing alcohol? Patient declined     Q2: How many drinks containing alcohol do you have on a typical day when you are drinking? Patient declined     Q3: How often do you have six or more drinks on one occasion? Patient declined        Utilities    In the past 12 months has the electric, gas, oil, or water company threatened to shut off services in your home? No        Health Literacy    How often do you need to have someone help you when you read instructions, pamphlets, or other written material from your doctor or pharmacy? Never                      SW met with pt to discuss discharge planning.  Pt lives with spouse and doesn't need assistance with ambulation and ADLs.  Pt will have transportation and assistance from spouse at discharge.  Discharge Plan A and Plan B have been determined by review of patient's clinical status, future medical and therapeutic needs, and coverage/benefits for post-acute care in coordination with multidisciplinary team members.      Tatyana KUO, CSW

## 2025-06-28 NOTE — NURSING
Resting in bed with. Respirations even and unlabored. Vital signs per flowsheet. C/o migraine. On call resident notified. See chart for new orders. Call light and personal items within reach. Safety measures ongoing.

## 2025-06-28 NOTE — PLAN OF CARE
Problem: Adult Inpatient Plan of Care  Goal: Plan of Care Review  Outcome: Ongoing  Goal: Patient-Specific Goal (Individualized)  Outcome: Ongoing  Goal: Absence of Hospital-Acquired Illness or Injury  Outcome: Ongoing  Goal: Optimal Comfort and Wellbeing  Outcome: Ongoing  Goal: Readiness for Transition of Care  Outcome: Ongoing     Problem: Wound  Goal: Optimal Coping  Outcome: Ongoing  Goal: Optimal Functional Ability  Outcome: Ongoing  Goal: Absence of Infection Signs and Symptoms  Outcome: Ongoing  Goal: Improved Oral Intake  Outcome: Ongoing  Goal: Optimal Pain Control and Function  Outcome: Ongoing  Goal: Skin Health and Integrity  Outcome: Ongoing  Goal: Optimal Wound Healing  Outcome: Ongoing     Problem: Fall Injury Risk  Goal: Absence of Fall and Fall-Related Injury  Outcome: Ongoing

## 2025-06-28 NOTE — PROGRESS NOTES
[facility]  General Surgery  Progress Note    Subjective:     Post-Op Info:  Procedure(s) (LRB):  XI ROBOTIC REPAIR, HIATAL HERNIA REDO, W/ FUNDOPLICATION (N/A)  EGD (ESOPHAGOGASTRODUODENOSCOPY) w/ endoflip (N/A)   1 Day Post-Op     Interval History: Tachy overnight to the 110s. Mildly responsive to fluids. EKG sinus tach. Pt reports being in pain overnight.     Medications:  Continuous Infusions:   lactated ringers   Intravenous Continuous 100 mL/hr at 06/27/25 1655 Rate Change at 06/27/25 1655     Scheduled Meds:   acetaminophen  1,000 mg Intravenous Q8H    enoxparin  40 mg Subcutaneous Q24H (prophylaxis, 1700)    ondansetron  4 mg Intravenous Q6H    pantoprazole  40 mg Intravenous Daily    potassium chloride  10 mEq Intravenous Q1H    scopolamine  1 patch Transdermal Q3 Days    sodium phosphate 15 mmol in D5W 250 mL IVPB  15 mmol Intravenous Once     PRN Meds:  Current Facility-Administered Medications:     HYDROmorphone, 0.5 mg, Intravenous, Q6H PRN    prochlorperazine, 5 mg, Intravenous, Q6H PRN     Review of patient's allergies indicates:   Allergen Reactions    Iodinated contrast media     Methocarbamol Itching    Percocet [oxycodone-acetaminophen] Nausea Only    Shellfish containing products Hives     Objective:     Vital Signs (Most Recent):  Temp: 99.4 °F (37.4 °C) (06/28/25 0734)  Pulse: 100 (06/28/25 0850)  Resp: 18 (06/28/25 0850)  BP: (!) 115/55 (06/28/25 0850)  SpO2: (!) 93 % (06/28/25 0850) Vital Signs (24h Range):  Temp:  [98.3 °F (36.8 °C)-99.7 °F (37.6 °C)] 99.4 °F (37.4 °C)  Pulse:  [] 100  Resp:  [14-20] 18  SpO2:  [93 %-100 %] 93 %  BP: (109-152)/(55-80) 115/55     Weight: 89.4 kg (197 lb 1.5 oz)  Body mass index is 37.24 kg/m².    Intake/Output - Last 3 Shifts         06/26 0700 06/27 0659 06/27 0700 06/28 0659 06/28 0700 06/29 0659    IV Piggyback  1000     Total Intake(mL/kg)  1000 (11.2)     Net  +1000            Unmeasured Urine Occurrence  6 x              Physical  Exam  Vitals and nursing note reviewed.   Constitutional:       Appearance: Normal appearance. She is not ill-appearing.   HENT:      Head: Normocephalic.      Mouth/Throat:      Mouth: Mucous membranes are moist.      Pharynx: Oropharynx is clear.   Eyes:      General: No scleral icterus.     Extraocular Movements: Extraocular movements intact.      Conjunctiva/sclera: Conjunctivae normal.   Cardiovascular:      Rate and Rhythm: Normal rate.      Pulses: Normal pulses.   Pulmonary:      Effort: Pulmonary effort is normal. No respiratory distress.      Breath sounds: No wheezing.   Abdominal:      General: Abdomen is flat. There is no distension.      Palpations: Abdomen is soft.      Comments: Incisions cdi w overlying dermabond   Musculoskeletal:         General: No swelling or tenderness. Normal range of motion.      Cervical back: Normal range of motion.   Skin:     General: Skin is warm and dry.      Coloration: Skin is not jaundiced or pale.   Neurological:      General: No focal deficit present.      Mental Status: She is alert and oriented to person, place, and time.   Psychiatric:         Mood and Affect: Mood normal.          Significant Labs:  I have reviewed all pertinent lab results within the past 24 hours.  CBC:   Recent Labs   Lab 06/28/25  0614   WBC 11.66   RBC 3.42*   HGB 10.7*   HCT 32.9*      MCV 96   MCH 31.3*   MCHC 32.5     CMP:   Recent Labs   Lab 06/28/25  0614   GLU 97   CALCIUM 8.4*      K 3.3*   CO2 23      BUN 7   CREATININE 0.6       Significant Diagnostics:  I have reviewed all pertinent imaging results/findings within the past 24 hours.  Assessment/Plan:     History of repair of hiatal hernia  Karyn Bobby is an 44 y.o. female that is s/p robo redo hiatal hernia repair + endoflip on 6/27.  Doing fine this morning. Tachycardia stable to preop and improving with pain meds.       - Diet: advance to clears  - Pain: multimodal, minimize narcotics  - OK for pills < pencil  eraser, liquids, or crushed  - Nausea control w/ scheduled and PRN antiemetics   - Home meds as appropriate  - Replete lytes PRN  - Daily labs- repeat cbc at noon  - OOB to chair and ambulate in halls   - Encourage IS  - DVT ppx     Dispo: Will plan for possible discharge this afternoon if tolerating diet, able to remain well hydrated. Post-op instructions given and discussed with patient this morning.          Peyton Montenegro MD  General Surgery  [facility] - Surgery

## 2025-06-30 ENCOUNTER — TELEPHONE (OUTPATIENT)
Dept: SURGERY | Facility: CLINIC | Age: 45
End: 2025-06-30
Payer: MEDICARE

## 2025-06-30 VITALS
RESPIRATION RATE: 18 BRPM | DIASTOLIC BLOOD PRESSURE: 75 MMHG | HEIGHT: 61 IN | OXYGEN SATURATION: 92 % | SYSTOLIC BLOOD PRESSURE: 127 MMHG | WEIGHT: 197.06 LBS | TEMPERATURE: 99 F | BODY MASS INDEX: 37.2 KG/M2 | HEART RATE: 95 BPM

## 2025-06-30 NOTE — TELEPHONE ENCOUNTER
Copied from CRM #[id number]. Topic: Medications - Medication Refill  >> Jun 30, 2025  8:52 AM Celsa wrote:  Refill Request    Type:  Rx Refill Request    Refill or New Rx:  Refill    Rx Name and Strength:   oxyCODONE (ROXICODONE) 5 mg/5 mL Soln    scopolamine (TRANSDERM-SCOP) 1.3-1.5 mg (1 mg over 3 days)    Preferred Pharmacy with phone number:    [id number] - [place] [address] [facility] [address] [facility] [facility] [address]  [address] [place] [address]  Phone: [phone] Fax: [phone]    Local or Mail Order:Local    Would the patient rather a call back or response vis [facility]?   Call Back    Best Call Back Number:  [phone]    Additional Information:  Pt  states she has been having a shortness of breath and would like for the nurse to call her to discuss it.  Thank you

## 2025-06-30 NOTE — TELEPHONE ENCOUNTER
ANGELM for patient to call me back at [phone] re:  Returning her call about SOB and rx refills  Advised would have to ask Dr. Bustillos about refills, would likely be a limited amount  ER precautions left on message for true SOB  Reminder set to follow up.

## 2025-06-30 NOTE — TELEPHONE ENCOUNTER
Spoke with patient  She is having some SOB with exertion, but is able to take good, deep breaths in through her nose.  Advised to monitor and if it worsens, she needs to be seen in the ER.  She stated she has tried Tylenol to help with the pain, but it isn't helping and would like a refill on her oxycodone.  Advised I would ask for refill.  She confirmed pharmacy is [facility] [address] in [place], [place].

## 2025-07-01 DIAGNOSIS — K44.9 HIATAL HERNIA: ICD-10-CM

## 2025-07-01 LAB
ESTROGEN SERPL-MCNC: NORMAL PG/ML
INSULIN SERPL-ACNC: NORMAL U[IU]/ML
LAB AP CLINICAL INFORMATION: NORMAL
LAB AP GROSS DESCRIPTION: NORMAL
LAB AP PERFORMING LOCATION(S): NORMAL
LAB AP REPORT FOOTNOTES: NORMAL

## 2025-07-02 RX ORDER — OXYCODONE HCL 5 MG/5 ML
5 SOLUTION, ORAL ORAL EVERY 4 HOURS PRN
Qty: 25 ML | Refills: 0 | OUTPATIENT
Start: 2025-07-02

## 2025-07-02 NOTE — TELEPHONE ENCOUNTER
LVM for patient to call me back at [phone] re:  Cannot refill narcotics due to pain contact but there are other things we can try.

## 2025-07-07 NOTE — ANESTHESIA POSTPROCEDURE EVALUATION
Anesthesia Post Evaluation    Patient: Karyn Bobby    Procedure(s) Performed: Procedure(s) (LRB):  XI ROBOTIC REPAIR, HIATAL HERNIA REDO, W/ FUNDOPLICATION (N/A)  EGD (ESOPHAGOGASTRODUODENOSCOPY) w/ endoflip (N/A)    Final Anesthesia Type: general      Patient location during evaluation: PACU  Patient participation: Yes- Able to Participate  Level of consciousness: awake  Post-procedure vital signs: reviewed and stable  Pain management: adequate  Airway patency: patent    PONV status at discharge: No PONV  Anesthetic complications: no      Cardiovascular status: blood pressure returned to baseline  Respiratory status: unassisted  Hydration status: euvolemic  Follow-up not needed.              Vitals Value Taken Time   /75 06/28/25 11:22   Temp 37.1 °C (98.7 °F) 06/28/25 11:22   Pulse 95 06/28/25 14:54   Resp 18 06/28/25 11:40   SpO2 92 % 06/28/25 11:22         Event Time   Out of Recovery 10:30:00         Pain/Gasper Score: No data recorded

## 2025-07-10 ENCOUNTER — OFFICE VISIT (OUTPATIENT)
Dept: SURGERY | Facility: CLINIC | Age: 45
End: 2025-07-10
Payer: MEDICARE

## 2025-07-10 VITALS
BODY MASS INDEX: 35.92 KG/M2 | DIASTOLIC BLOOD PRESSURE: 86 MMHG | HEART RATE: 96 BPM | SYSTOLIC BLOOD PRESSURE: 128 MMHG | HEIGHT: 61 IN | WEIGHT: 190.25 LBS

## 2025-07-10 DIAGNOSIS — Z09 POSTOP CHECK: Primary | ICD-10-CM

## 2025-07-10 PROCEDURE — 99999 PR PBB SHADOW E&M-EST. PATIENT-LVL III: CPT | Mod: PBBFAC,,, | Performed by: SURGERY

## 2025-07-10 PROCEDURE — 99024 POSTOP FOLLOW-UP VISIT: CPT | Mod: POP,,, | Performed by: SURGERY

## 2025-07-10 PROCEDURE — 99213 OFFICE O/P EST LOW 20 MIN: CPT | Mod: PBBFAC | Performed by: SURGERY

## 2025-07-10 NOTE — PROGRESS NOTES
"Karyn Bobby is a 44 y.o. female patient.   1. Postop check      Past Medical History:   Diagnosis Date    ACL (anterior cruciate ligament) rupture     RIGHT    Anxiety     Arthritis     Carpal tunnel syndrome     Contact lens/glasses fitting     Crohn's disease     Depression     Esophagitis     GERD (gastroesophageal reflux disease)     Joint pain     Migraine headache     Patella fracture     PVC (premature ventricular contraction)     STD (sexually transmitted disease) 2001    chlamydia     No past surgical history pertinent negatives on file.  Scheduled Meds:  Continuous Infusions:  PRN Meds:    Review of patient's allergies indicates:   Allergen Reactions    Iodinated contrast media     Methocarbamol Itching    Percocet [oxycodone-acetaminophen] Nausea Only    Shellfish containing products Hives     There are no hospital problems to display for this patient.    Blood pressure 128/86, pulse 96, height 5' 1" (1.549 m), weight 86.3 kg (190 lb 4.1 oz), last menstrual period 06/01/2025.    Subjective  S/p robotic redo hh with toupet 6/27/25.  She says she feels 100% better than prior to surgery.  She has no nausea, no vomiting and no heartburn.  She has slight dysphagia, if she eats too fast.  She is not taking zofran and no heartburn medication.  Objective:  Vital signs (most recent): Blood pressure 128/86, pulse 96, height 5' 1" (1.549 m), weight 86.3 kg (190 lb 4.1 oz), last menstrual period 06/01/2025.  Wounds clear  Assessment & Plan  Doing well.  Advance diet as tolerated.  Light duty until August 8th (she has  her granddaughter a few times and this caused some pain).  Follow up prn.    Evan Bustillos MD  7/10/2025        "

## 2025-07-10 NOTE — LETTER
July 10, 2025        Connor Clements II, NP  [address]  [place] [address]             [facility]  [address]  [facility] [address]  Phone: [phone]   Patient: Karyn Bobby   MR Number: [id number]   YOB: 1980   Date of Visit: 7/10/2025       Dear Dr. Clements:    Thank you for referring Karyn Bobby to me for evaluation. Attached you will find relevant portions of my assessment and plan of care.    If you have questions, please do not hesitate to call me. I look forward to following Karyn Bobby along with you.    Sincerely,      Evan Bustillos MD            CC  No Recipients    Enclosure

## (undated) DEVICE — KIT ANTIFOG W/SPONG & FLUID

## (undated) DEVICE — Device

## (undated) DEVICE — TROCAR ENDOPATH XCEL 5X75MM

## (undated) DEVICE — ELECTRODE MEGADYNE RETURN DUAL

## (undated) DEVICE — SEAL UNIVERSAL 5MM-8MM XI

## (undated) DEVICE — SUT VICRYL+ 27 UR-6 VIOL

## (undated) DEVICE — SUT GUT PL. 4-0 27 FS-2

## (undated) DEVICE — ADHESIVE DERMABOND ADVANCED

## (undated) DEVICE — TUBE SET SINGLE LUMEN FILTERED

## (undated) DEVICE — IRRIGATOR ENDOWRIST XI SUCTION

## (undated) DEVICE — DRAPE COLUMN DAVINCI XI

## (undated) DEVICE — TRAY MINOR GEN SURG OMC

## (undated) DEVICE — DEVICE SYNCHROSEAL DA VINCI

## (undated) DEVICE — GOWN SURGICAL X-LARGE

## (undated) DEVICE — GOWN POLY REINF BRTH SLV XL

## (undated) DEVICE — DRAPE ARM DAVINCI XI

## (undated) DEVICE — BLADE SURG CARBON STEEL SZ11

## (undated) DEVICE — TUBING SUCTION STERILE

## (undated) DEVICE — DRAPE SCOPE PILLOW WARMER

## (undated) DEVICE — SOL NACL 0.9% IV INJ 1000ML

## (undated) DEVICE — OBTURATOR BLADELESS 8MM XI

## (undated) DEVICE — LUBRICANT SURGILUBE 2 OZ

## (undated) DEVICE — PENCIL SMK EVAC CONNECTOR 10FT

## (undated) DEVICE — COVER TABLE 77 X 96